# Patient Record
Sex: FEMALE | Race: WHITE | Employment: OTHER | URBAN - METROPOLITAN AREA
[De-identification: names, ages, dates, MRNs, and addresses within clinical notes are randomized per-mention and may not be internally consistent; named-entity substitution may affect disease eponyms.]

---

## 2017-12-05 ENCOUNTER — HOSPITAL ENCOUNTER (OUTPATIENT)
Dept: ENDOSCOPY | Age: 77
Discharge: HOME | End: 2017-12-05
Attending: INTERNAL MEDICINE | Admitting: INTERNAL MEDICINE

## 2017-12-05 RX ORDER — SODIUM CHLORIDE 0.9 % (FLUSH) 0.9 %
10 SYRINGE (ML) INJECTION PRN
Status: DISCONTINUED | OUTPATIENT
Start: 2017-12-05 | End: 2017-12-06 | Stop reason: HOSPADM

## 2017-12-05 RX ORDER — SODIUM CHLORIDE 9 MG/ML
INJECTION, SOLUTION INTRAVENOUS CONTINUOUS
Status: DISCONTINUED | OUTPATIENT
Start: 2017-12-05 | End: 2017-12-06 | Stop reason: HOSPADM

## 2017-12-05 RX ORDER — SODIUM CHLORIDE 0.9 % (FLUSH) 0.9 %
10 SYRINGE (ML) INJECTION EVERY 12 HOURS SCHEDULED
Status: DISCONTINUED | OUTPATIENT
Start: 2017-12-05 | End: 2017-12-06 | Stop reason: HOSPADM

## 2017-12-05 NOTE — ANESTHESIA PRE-OP
Induction: intravenous. Anesthetic plan and risks discussed with patient. Plan discussed with CRNA.                   Emir Hayes MD   12/5/2017

## 2017-12-20 ENCOUNTER — OFFICE VISIT (OUTPATIENT)
Dept: ENT CLINIC | Age: 77
End: 2017-12-20

## 2017-12-20 VITALS
WEIGHT: 98.8 LBS | SYSTOLIC BLOOD PRESSURE: 105 MMHG | HEART RATE: 68 BPM | BODY MASS INDEX: 18.65 KG/M2 | DIASTOLIC BLOOD PRESSURE: 72 MMHG | HEIGHT: 61 IN

## 2017-12-20 DIAGNOSIS — J32.9 CHRONIC SINUSITIS, UNSPECIFIED LOCATION: Primary | ICD-10-CM

## 2017-12-20 DIAGNOSIS — R09.82 POST-NASAL DRIP: ICD-10-CM

## 2017-12-20 PROCEDURE — 99203 OFFICE O/P NEW LOW 30 MIN: CPT | Performed by: OTOLARYNGOLOGY

## 2017-12-20 RX ORDER — FENOFIBRATE 145 MG/1
134 TABLET, COATED ORAL DAILY
COMMUNITY

## 2017-12-20 NOTE — PROGRESS NOTES
polypectomy    KNEE SURGERY      ROTATOR CUFF REPAIR      SINUS SURGERY      UPPER GASTROINTESTINAL ENDOSCOPY  08/20/10         EXAMINATION:    VITALS SIGNS reviewed. Vitals:    12/20/17 1503   BP: 105/72   Site: Left Arm   Position: Sitting   Pulse: 68   Weight: 98 lb 12.8 oz (44.8 kg)   Height: 5' 1\" (1.549 m)     GENERAL. APPEARANCE:  Well developed, well nourished, no apparent distress, no apparent deformities. ABILITY TO COMMUNICATE/QUALITY OF VOICE:  Communicated without difficulty. Normal voice. INSPECTION, HEAD AND FACE:  Normal overall appearance, with no scars, lesions or masses. SINUSES:  The maxillary and frontal sinuses were nontender, bilaterally. SALIVARY GLANDS:  Parotid, submandibular and sublingual glands were normal.  FACIAL STRENGTH, MOTION:  Normal and equal for all five branches bilaterally. EYES:  Extraocular motion:  intact, normal primary gaze alignment. HEARING ASSESSMENT:  Finger rub:  Able to hear finger rub bilaterally. Tuning fork tests, 512 Hertz tuning fork:  Celestin midline. Rinne air > bone bilaterally. EARS, OTOSCOPY:  The TMs and EACs appeared to be normal bilaterally. EXTERNAL EAR/NOSE:  Normal pinnae and mastoids. Normal external nose. (+) NOSE:  The nasal septum was mildly deviated to the right. The inferior turbinates were normal.  The nasal mucosa and secretions were normal.  No pus or polyps were seen. LIPS, TEETH AND GUMS:  Normal.  OROPHARYNX/ORAL CAVITY:  Oral mucosa, hard and soft palates, tongue, and pharynx were normal.  NECK:  No masses or tenderness. No abnormal appearance, asymmetry or tracheal deviation. Laryngeal cartilages and hyoid bone were normal to palpation. THYROID:  No nodules, enlargement, tenderness or masses. LYMPH NODES, CERVICAL, FACIAL AND SUPRACLAVICULAR:  No lymphadenopathy. IMPRESSION / Lavern Loja / Say Cisneros:       Dakotah Smith was seen today for sinusitis.     Diagnoses and all orders for this visit:    Chronic sinusitis,

## 2018-01-24 ENCOUNTER — HOSPITAL ENCOUNTER (OUTPATIENT)
Dept: CT IMAGING | Age: 78
Discharge: HOME | End: 2018-01-24
Attending: OTOLARYNGOLOGY | Admitting: OTOLARYNGOLOGY

## 2018-01-24 DIAGNOSIS — J32.9 CHRONIC SINUSITIS, UNSPECIFIED LOCATION: ICD-10-CM

## 2018-01-24 DIAGNOSIS — J32.9 CHRONIC SINUSITIS: ICD-10-CM

## 2018-01-24 DIAGNOSIS — R09.82 POST-NASAL DRIP: ICD-10-CM

## 2018-01-29 ENCOUNTER — OFFICE VISIT (OUTPATIENT)
Dept: ENT CLINIC | Age: 78
End: 2018-01-29

## 2018-01-29 VITALS
WEIGHT: 92 LBS | DIASTOLIC BLOOD PRESSURE: 57 MMHG | BODY MASS INDEX: 17.38 KG/M2 | SYSTOLIC BLOOD PRESSURE: 106 MMHG | HEART RATE: 85 BPM

## 2018-01-29 DIAGNOSIS — R09.82 POST-NASAL DRIP: ICD-10-CM

## 2018-01-29 DIAGNOSIS — J32.0 RIGHT MAXILLARY SINUSITIS, CHRONIC: Primary | ICD-10-CM

## 2018-01-29 PROCEDURE — 31231 NASAL ENDOSCOPY DX: CPT | Performed by: OTOLARYNGOLOGY

## 2018-01-29 NOTE — PROGRESS NOTES
PROCEDURE - Diagnostic Nasal Endoscopy, bilateral  [CPT 28651]    INFORMED CONSENT    Risks, benefits, and alternatives of diagnostic nasal endoscopy were explained to the patient. Specific mention was made of the risk of nosebleed, drainage, throat numbness with difficulty swallowing, and pain from the procedure. The patient gave oral consent to proceed. INDICATIONS/HISTORY  Chronic, recurrent sinusitis, with daily headache, and postnasal drainage. History of prior sinus surgery 10 years ago, with no improvement. twice in the past.. FINDINGS    The nasal septum was essentially midline. There were well-healed post surgical changes bilaterally. On the left, there was evidence of a sphenoid sinusotomy, which was patent and clear. The left middle meatus and nasal cavity was clear with no pus or polyps. The left uncinate process and natural ostium in the maxillary sinus appeared to be intact. Frontal recess area appeared to be clear. On the right side, there was a normal sphenoid sinus ostium, which was patent. The right middle meatus was clear and the antrostomy patent. The frontal recess was clear and the frontal sinus ostia was visible and there was good transillumination of the right frontal sinus. There was no pus or polyps, on the right side. The middle and inferior turbinates appeared to be normal bilaterally. The remainder of the right left nasal cavities appeared to be normal.      DESCRIPTION OF PROCEDURE    The nasal cavities were decongested and anesthetized with a mixture of equal parts of 0.05% oxymetazoline solution and 4% lidocaine solution by nasal sprayer. This was repeated after 5 minutes. After an appropriate elapse of time, the 4.4 mm 30 degree rigid nasal telescope was inserted into the left nasal cavity. Endoscopy of the inferior and middle meatus and nasal cavity to the posterior choana was performed with the above findings.   The procedure was repeated on the right side with the above findings. The patient tolerated the procedure well with no evidence of complication. Instructions were given to avoid eating or drinking for 1 hour. REVIEW OF IMAGES:       I independently reviewed the images of the CT scan of the sinuses, of 1/24/2018, showing minimal mucosal thickening in the floor of the right maxillary sinus. The remainder of the sinuses were all clear and normal.  There was evidence of prior endoscopic sinus surgery with bilateral middle meatal antrostomies and partial ethmoidectomy bilaterally. Narrative   EXAMINATION:   CT OF THE SINUS WITHOUT CONTRAST  1/24/2018 11:42 am       TECHNIQUE:   CT of the sinuses was performed without the administration of intravenous   contrast. Multiplanar reformatted images are provided for review. Dose   modulation, iterative reconstruction, and/or weight based adjustment of the   mA/kV was utilized to reduce the radiation dose to as low as reasonably   achievable.       COMPARISON:   CT 02/25/2011       HISTORY:   ORDERING PHYSICIAN PROVIDED HISTORY: Chronic sinusitis, unspecified location   TECHNOLOGIST PROVIDED HISTORY:   Technologist Provided Reason for Exam: Chronic sinusitis, unspecified   location-Post-nasal drip chronic post nasal drip and chronic sinusitis, h/o   sinus surgery 10 years ago   Acuity: Chronic   Type of Encounter: Ongoing       FINDINGS:   SINUSES:  The left maxillary, sphenoid and frontal sinuses are clear.    Minimal mucosal thickening in the ethmoid air cells and right maxillary   sinus.  Postsurgical changes of resection of the middle turbinates.       MASTOIDS:  The mastoid air cells are well aerated.       SOFT TISSUES:  Visualized soft tissues demonstrate no acute abnormality.  The   visualized portion of the intracranial contents demonstrate no gross acute   abnormality.           Impression   Postsurgical changes in the maxillary sinuses and nasal passage with only   minimal mucosal thickening in the right maxillary sinus and ethmoid air cells.             IMPRESSION / DIAGNOSES / Aries Duncan / PROCEDURES:       Laith Valdez was seen today for sinusitis. Diagnoses and all orders for this visit:    Right maxillary sinusitis, chronic    Post-nasal drip         RECOMMENDATIONS / PLAN:             1. I do not recommend revision functional endoscopic sinus surgery at this time. 2. Return for episodes of acute sinusitis, if possible. 3. Sinus rinse 1 to 2 times daily. See patient instructions. Return for any further Ear, Nose, Throat, or Sinus problems.

## 2019-06-06 ENCOUNTER — HOSPITAL ENCOUNTER (OUTPATIENT)
Dept: GENERAL RADIOLOGY | Age: 79
Discharge: HOME | End: 2019-06-06
Payer: MEDICARE

## 2019-06-06 ENCOUNTER — HOSPITAL ENCOUNTER (OUTPATIENT)
Age: 79
Discharge: HOME | End: 2019-06-06
Payer: MEDICARE

## 2019-06-06 DIAGNOSIS — Z78.9 DISCOMFORT: ICD-10-CM

## 2019-06-06 PROCEDURE — 74019 RADEX ABDOMEN 2 VIEWS: CPT

## 2021-04-30 PROCEDURE — 99283 EMERGENCY DEPT VISIT LOW MDM: CPT

## 2021-05-01 ENCOUNTER — HOSPITAL ENCOUNTER (EMERGENCY)
Age: 81
Discharge: HOME | End: 2021-05-01
Payer: MEDICARE

## 2021-05-01 VITALS
BODY MASS INDEX: 18.89 KG/M2 | TEMPERATURE: 97.6 F | RESPIRATION RATE: 18 BRPM | HEART RATE: 59 BPM | OXYGEN SATURATION: 98 % | WEIGHT: 100 LBS | SYSTOLIC BLOOD PRESSURE: 126 MMHG | DIASTOLIC BLOOD PRESSURE: 62 MMHG

## 2021-05-01 DIAGNOSIS — T50.901A ACCIDENTAL DRUG INGESTION, INITIAL ENCOUNTER: Primary | ICD-10-CM

## 2021-05-01 LAB
A/G RATIO: 1.8 (ref 1.1–2.2)
ALBUMIN SERPL-MCNC: 4.2 G/DL (ref 3.4–5)
ALP BLD-CCNC: 54 U/L (ref 40–129)
ALT SERPL-CCNC: 15 U/L (ref 10–40)
ANION GAP SERPL CALCULATED.3IONS-SCNC: 7 MMOL/L (ref 3–16)
AST SERPL-CCNC: 23 U/L (ref 15–37)
BASOPHILS ABSOLUTE: 0 K/UL (ref 0–0.2)
BASOPHILS RELATIVE PERCENT: 0.3 %
BILIRUB SERPL-MCNC: 0.3 MG/DL (ref 0–1)
BUN BLDV-MCNC: 23 MG/DL (ref 7–20)
CALCIUM SERPL-MCNC: 9.9 MG/DL (ref 8.3–10.6)
CHLORIDE BLD-SCNC: 106 MMOL/L (ref 99–110)
CO2: 26 MMOL/L (ref 21–32)
CREAT SERPL-MCNC: 1.1 MG/DL (ref 0.6–1.2)
EKG ATRIAL RATE: 59 BPM
EKG DIAGNOSIS: NORMAL
EKG P AXIS: 70 DEGREES
EKG P-R INTERVAL: 166 MS
EKG Q-T INTERVAL: 428 MS
EKG QRS DURATION: 74 MS
EKG QTC CALCULATION (BAZETT): 423 MS
EKG R AXIS: 20 DEGREES
EKG T AXIS: 50 DEGREES
EKG VENTRICULAR RATE: 59 BPM
EOSINOPHILS ABSOLUTE: 0.2 K/UL (ref 0–0.6)
EOSINOPHILS RELATIVE PERCENT: 3.1 %
GFR AFRICAN AMERICAN: 58
GFR NON-AFRICAN AMERICAN: 48
GLOBULIN: 2.4 G/DL
GLUCOSE BLD-MCNC: 112 MG/DL (ref 70–99)
HCT VFR BLD CALC: 38 % (ref 36–48)
HEMOGLOBIN: 12.2 G/DL (ref 12–16)
LYMPHOCYTES ABSOLUTE: 1.1 K/UL (ref 1–5.1)
LYMPHOCYTES RELATIVE PERCENT: 19.2 %
MCH RBC QN AUTO: 28.6 PG (ref 26–34)
MCHC RBC AUTO-ENTMCNC: 32.1 G/DL (ref 31–36)
MCV RBC AUTO: 89.2 FL (ref 80–100)
MONOCYTES ABSOLUTE: 0.4 K/UL (ref 0–1.3)
MONOCYTES RELATIVE PERCENT: 7.6 %
NEUTROPHILS ABSOLUTE: 4.1 K/UL (ref 1.7–7.7)
NEUTROPHILS RELATIVE PERCENT: 69.8 %
PDW BLD-RTO: 14 % (ref 12.4–15.4)
PLATELET # BLD: 243 K/UL (ref 135–450)
PMV BLD AUTO: 9 FL (ref 5–10.5)
POTASSIUM REFLEX MAGNESIUM: 4 MMOL/L (ref 3.5–5.1)
RBC # BLD: 4.26 M/UL (ref 4–5.2)
SODIUM BLD-SCNC: 139 MMOL/L (ref 136–145)
TOTAL PROTEIN: 6.6 G/DL (ref 6.4–8.2)
WBC # BLD: 5.9 K/UL (ref 4–11)

## 2021-05-01 PROCEDURE — 93005 ELECTROCARDIOGRAM TRACING: CPT | Performed by: PHYSICIAN ASSISTANT

## 2021-05-01 PROCEDURE — 36415 COLL VENOUS BLD VENIPUNCTURE: CPT

## 2021-05-01 PROCEDURE — 80053 COMPREHEN METABOLIC PANEL: CPT

## 2021-05-01 PROCEDURE — 93010 ELECTROCARDIOGRAM REPORT: CPT | Performed by: INTERNAL MEDICINE

## 2021-05-01 PROCEDURE — 85025 COMPLETE CBC W/AUTO DIFF WBC: CPT

## 2021-05-01 ASSESSMENT — ENCOUNTER SYMPTOMS
VOMITING: 0
SHORTNESS OF BREATH: 0
NAUSEA: 0
ABDOMINAL PAIN: 0

## 2021-05-01 NOTE — ED PROVIDER NOTES
This is an independent ELIAN patient encounter. I am available for consultation if needed. I did not perform a face-to-face evaluation of this patient and was not asked to see the patient. I was not made aware of any details of the patient's H&P or medical decision making but was asked to review and document this EKG. See my interpretation of the EKG below. I shared my findings and interpretation with the ELIAN for use in his/her independent management of this patient. See his/her note for details of the patient's history, physical, and all medical decision making.       The 12 lead EKG was interpreted by me as follows:  Rate: bradycardia with a rate of 59  Rhythm: sinus  Axis: normal  Intervals: normal FL, narrow QRS, normal QTc  ST segments: no ST elevations or depressions  T waves: no abnormal inversions  Non-specific T wave changes: not present  Prior EKG comparison: No prior is currently available for comparison          Kristi Bahena MD  05/01/21 0198

## 2022-11-10 ENCOUNTER — OFFICE VISIT (OUTPATIENT)
Dept: ORTHOPEDIC SURGERY | Age: 82
End: 2022-11-10
Payer: MEDICARE

## 2022-11-10 VITALS — HEIGHT: 61 IN | WEIGHT: 100 LBS | BODY MASS INDEX: 18.88 KG/M2

## 2022-11-10 DIAGNOSIS — M17.12 PRIMARY OSTEOARTHRITIS OF LEFT KNEE: Primary | ICD-10-CM

## 2022-11-10 DIAGNOSIS — M25.562 LEFT KNEE PAIN, UNSPECIFIED CHRONICITY: ICD-10-CM

## 2022-11-10 PROCEDURE — 99203 OFFICE O/P NEW LOW 30 MIN: CPT | Performed by: ORTHOPAEDIC SURGERY

## 2022-11-10 PROCEDURE — 1090F PRES/ABSN URINE INCON ASSESS: CPT | Performed by: ORTHOPAEDIC SURGERY

## 2022-11-10 PROCEDURE — G8484 FLU IMMUNIZE NO ADMIN: HCPCS | Performed by: ORTHOPAEDIC SURGERY

## 2022-11-10 PROCEDURE — G8427 DOCREV CUR MEDS BY ELIG CLIN: HCPCS | Performed by: ORTHOPAEDIC SURGERY

## 2022-11-10 PROCEDURE — 20610 DRAIN/INJ JOINT/BURSA W/O US: CPT | Performed by: ORTHOPAEDIC SURGERY

## 2022-11-10 PROCEDURE — G8420 CALC BMI NORM PARAMETERS: HCPCS | Performed by: ORTHOPAEDIC SURGERY

## 2022-11-10 RX ORDER — TRIAMCINOLONE ACETONIDE 40 MG/ML
40 INJECTION, SUSPENSION INTRA-ARTICULAR; INTRAMUSCULAR ONCE
Status: COMPLETED | OUTPATIENT
Start: 2022-11-10 | End: 2022-11-10

## 2022-11-10 RX ORDER — LIDOCAINE HYDROCHLORIDE 10 MG/ML
40 INJECTION, SOLUTION INFILTRATION; PERINEURAL ONCE
Status: COMPLETED | OUTPATIENT
Start: 2022-11-10 | End: 2022-11-10

## 2022-11-10 RX ADMIN — TRIAMCINOLONE ACETONIDE 40 MG: 40 INJECTION, SUSPENSION INTRA-ARTICULAR; INTRAMUSCULAR at 15:35

## 2022-11-10 RX ADMIN — LIDOCAINE HYDROCHLORIDE 40 MG: 10 INJECTION, SOLUTION INFILTRATION; PERINEURAL at 15:34

## 2022-11-12 PROBLEM — M17.12 PRIMARY OSTEOARTHRITIS OF LEFT KNEE: Status: ACTIVE | Noted: 2022-11-12

## 2022-11-12 NOTE — PROGRESS NOTES
CHIEF COMPLAINT: Left knee pain/osteoarthritis. HISTORY:  Ms. Gina Palacio 79 y.o.  female presents today for consultation request from Harika Clarke MD for evaluation of left knee pain which started Oct 2022. She is complaining of sharp pain, 10/10. Pain is increase with standing and walking and decrease with rest. Pain is sharp early in the morning with first few steps, dull achy pain by the end of the day. Alleviating factors: rest. No radiation and no numbness and tingling sensation. No other complaint. No h/o trauma or gout.     Past Medical History:   Diagnosis Date    Clostridium difficile diarrhea 12/7/14    Diverticulitis     GERD (gastroesophageal reflux disease)     GERD (gastroesophageal reflux disease)     Hyperlipidemia     Migraine     Pancreatitis        Past Surgical History:   Procedure Laterality Date    APPENDECTOMY      CHOLECYSTECTOMY      COLON SURGERY  8/21/2010    subtotal    COLONOSCOPY  08/20/10    with polypectomy    KNEE SURGERY      ROTATOR CUFF REPAIR      SINUS SURGERY      UPPER GASTROINTESTINAL ENDOSCOPY  08/20/10       Social History     Socioeconomic History    Marital status:      Spouse name: Not on file    Number of children: Not on file    Years of education: Not on file    Highest education level: Not on file   Occupational History    Not on file   Tobacco Use    Smoking status: Never    Smokeless tobacco: Never   Substance and Sexual Activity    Alcohol use: No    Drug use: No    Sexual activity: Yes     Partners: Male   Other Topics Concern    Not on file   Social History Narrative    Not on file     Social Determinants of Health     Financial Resource Strain: Not on file   Food Insecurity: Not on file   Transportation Needs: Not on file   Physical Activity: Not on file   Stress: Not on file   Social Connections: Not on file   Intimate Partner Violence: Not on file   Housing Stability: Not on file       Family History   Problem Relation Age of Onset    Heart Disease Mother     Diabetes Brother        Current Outpatient Medications on File Prior to Visit   Medication Sig Dispense Refill    fenofibrate (TRICOR) 145 MG tablet Take 134 mg by mouth daily      Fexofenadine HCl (MUCINEX ALLERGY PO) Take by mouth daily      topiramate (TOPAMAX SPRINKLE) 25 MG capsule Take 25 mg by mouth daily      Pseudoephedrine-Guaifenesin (MUCINEX D PO) Take 1 tablet by mouth 2 times daily. aspirin 81 MG chewable tablet Take 81 mg by mouth daily. SUMAtriptan (IMITREX) 25 MG tablet Take 25 mg by mouth once as needed. dicyclomine (BENTYL) 20 MG tablet Take 40 mg by mouth three times daily. diphenoxylate-atropine (LOMOTIL) 2.5-0.025 MG per tablet Take 1 tablet by mouth 4 times daily as needed. SUMAtriptan (IMITREX) 6 MG/0.5ML SOLN injection Inject 6 mg into the skin once as needed. butalbital-acetaminophen-caffeine (FIORICET, ESGIC) per tablet Take 1 tablet by mouth every 4 hours as needed. calcium carbonate (OSCAL) 500 MG TABS tablet Take 500 mg by mouth 2 times daily. atorvastatin (LIPITOR) 20 MG tablet Take 20 mg by mouth daily. esomeprazole (NEXIUM) 40 MG capsule Take 40 mg by mouth every morning (before breakfast). No current facility-administered medications on file prior to visit. Pertinent items are noted in HPI  Review of systems reviewed from Patient History Form and available in the patient's chart under the Media tab. PHYSICAL EXAMINATION:  Ms. Dionisio Luis is a very pleasant 79 y.o.  female who presents today in no acute distress, awake, alert, and oriented. She is well dressed, nourished and  groomed. Patient with normal affect. Height is  5' 1\" (1.549 m), weight is 100 lb (45.4 kg), Body mass index is 18.89 kg/m². Resting respiratory rate is 16. Examination of the gait, showed that the patient walks heel-toe with a non-antalgic gait and no limp.   Examination of both knees showing full ROM, left mild crepitus, tenderness on medial joint line, stable to varus and valgus stress. She has intact sensation and good pedal pulses. She has good strength in 2 planes, and has mild tenderness on deep palpation over the medial joint line. Knee reflex 1+ bilaterally. IMAGING:  Xray 3 views of the left knee was obtained today in the office and reviewed. These demonstrate moderate degenerative changes with narrowing of the joint space in the medial joint space compartment, subchondral sclerosis, and marginal osteophytosis. IMPRESSION: Left knee DJD. PLAN: I discussed with the patient the treatment options including both surgical and non-surgical treatment. We recommended Quad exercises and stretching of the calf and hamstrings which was taught to the patient today. She will take NSAIDS. I believe she will benefit from cortisone injection left knee, and she agreed to have. PROCEDURE:    With the patient's permission, and under sterile condition, the left knee was prepared and draped with alcohol and injected with a mixture of 1 mL Kenalog 40mg and 4 mL of 1% lidocaine through the medial parapatellar port area. The patient tolerated the procedure well. A band-aid was applied and the patient was advised to ice the knee for 15-20 minutes to relieve any injection site related pain. She reported a good improvement immediatly after the injection. F/u in 3-4 months, PT if needed. She understands that this may need surgery if the pain did not to resolve. Thank you very much for the kind consultation and allowing me to participate in this patient's care. I will continue to keep you apprised of her progress.         Emilia Hernández MD

## 2023-03-27 ENCOUNTER — HOSPITAL ENCOUNTER (OUTPATIENT)
Dept: GENERAL RADIOLOGY | Age: 83
Discharge: HOME | End: 2023-03-27
Payer: MEDICARE

## 2023-03-27 ENCOUNTER — HOSPITAL ENCOUNTER (OUTPATIENT)
Age: 83
Discharge: HOME | End: 2023-03-27
Payer: MEDICARE

## 2023-03-27 DIAGNOSIS — R05.9 COMPLAINING OF COUGH: ICD-10-CM

## 2023-03-27 PROCEDURE — 71046 X-RAY EXAM CHEST 2 VIEWS: CPT

## 2023-04-19 ENCOUNTER — HOSPITAL ENCOUNTER (OUTPATIENT)
Dept: CT IMAGING | Age: 83
Discharge: HOME | End: 2023-04-19
Payer: MEDICARE

## 2023-04-19 DIAGNOSIS — D38.1 NEOPLASM OF UNCERTAIN BEHAVIOR OF TRACHEA, BRONCHUS, AND LUNG: ICD-10-CM

## 2023-04-19 PROCEDURE — 71260 CT THORAX DX C+: CPT

## 2023-04-19 PROCEDURE — 6360000004 HC RX CONTRAST MEDICATION: Performed by: OTOLARYNGOLOGY

## 2023-04-19 RX ADMIN — IOPAMIDOL 75 ML: 755 INJECTION, SOLUTION INTRAVENOUS at 13:13

## 2023-04-26 ENCOUNTER — TELEPHONE (OUTPATIENT)
Dept: PULMONOLOGY | Age: 83
End: 2023-04-26

## 2023-04-26 NOTE — TELEPHONE ENCOUNTER
HAO on  for pt to call office to schedule a NP appt with Dr. Angella Perales.   Appt held 04/27/23 at 1:45pm

## 2023-04-27 ENCOUNTER — OFFICE VISIT (OUTPATIENT)
Dept: PULMONOLOGY | Age: 83
End: 2023-04-27
Payer: MEDICARE

## 2023-04-27 VITALS
OXYGEN SATURATION: 98 % | WEIGHT: 95 LBS | SYSTOLIC BLOOD PRESSURE: 131 MMHG | HEART RATE: 73 BPM | DIASTOLIC BLOOD PRESSURE: 70 MMHG | BODY MASS INDEX: 17.95 KG/M2

## 2023-04-27 DIAGNOSIS — R91.8 MULTIPLE LUNG NODULES ON CT: Primary | ICD-10-CM

## 2023-04-27 PROCEDURE — G8427 DOCREV CUR MEDS BY ELIG CLIN: HCPCS | Performed by: INTERNAL MEDICINE

## 2023-04-27 PROCEDURE — 1090F PRES/ABSN URINE INCON ASSESS: CPT | Performed by: INTERNAL MEDICINE

## 2023-04-27 PROCEDURE — G8419 CALC BMI OUT NRM PARAM NOF/U: HCPCS | Performed by: INTERNAL MEDICINE

## 2023-04-27 PROCEDURE — 99204 OFFICE O/P NEW MOD 45 MIN: CPT | Performed by: INTERNAL MEDICINE

## 2023-04-27 ASSESSMENT — ENCOUNTER SYMPTOMS
SINUS PRESSURE: 0
CHOKING: 0
BLOOD IN STOOL: 0
WHEEZING: 0
DIARRHEA: 0
VOICE CHANGE: 0
RHINORRHEA: 0
SHORTNESS OF BREATH: 0
ABDOMINAL PAIN: 0
CONSTIPATION: 0
APNEA: 0
CHEST TIGHTNESS: 0
COUGH: 1
ABDOMINAL DISTENTION: 0
STRIDOR: 0
BACK PAIN: 0
ANAL BLEEDING: 0
SORE THROAT: 0

## 2023-04-27 NOTE — PROGRESS NOTES
Yunior Galaviz    YOB: 1940     Date of Service:  4/27/2023     Chief Complaint   Patient presents with    Abnormal CT     Referred by Dr Flo Moise         HPI referred for consultation by Dr. Sabine Astorga MD for evaluation of abnormal CT chest.  Patient has been accompanied by her son to our office today. Patient states that she has had a chronic cough-persistent for approximately a month, but improved with Z-Sree which was prescribed by PCP. She initially was coughing up brown-colored phlegm. She also reports on and off hemoptysis, sometimes and copious quantities and sometimes associated with nosebleeds. Patient's  was diagnosed with severe COVID-19 infection requiring hospitalization, at around the same time of her illness. She states that she was never tested for COVID-19 herself. She underwent a CT chest performed on 4/19, which showed multiple lung nodules and hence pulmonary consultation has been requested by PCP. History of significant sinus related issues, surgery x2 in the remote past.  Continues to have recurrent postnasal drip and occasional epistaxis. Patient also reports recurrent \"bronchitis episodes\" in the past.    Patient denies any shortness of breath, chest pain or fevers. She has been cachectic-but weight has been steady for over 10 years. Patient has history of subtotal colectomy related to diverticular bleed in 2010. Non-smoker. 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     Allergies   Allergen Reactions    Sulfa Antibiotics      No outpatient medications have been marked as taking for the 4/27/23 encounter (Office Visit) with Luis Alfredo Carballo MD.       Immunization History   Administered Date(s) Administered    COVID-19, PFIZER PURPLE top, DILUTE for use, (age 15 y+), 30mcg/0.3mL 04/12/2021, 05/03/2021    Influenza Virus Vaccine 10/06/2014    Pneumococcal Conjugate 7-valent (Rozella Rough) 10/20/2009       Past Medical History:

## 2023-05-22 ENCOUNTER — HOSPITAL ENCOUNTER (OUTPATIENT)
Age: 83
Discharge: HOME | End: 2023-05-22
Payer: MEDICARE

## 2023-05-22 ENCOUNTER — HOSPITAL ENCOUNTER (OUTPATIENT)
Dept: PET IMAGING | Age: 83
Discharge: HOME | End: 2023-05-22
Payer: MEDICARE

## 2023-05-22 DIAGNOSIS — R91.8 MULTIPLE LUNG NODULES ON CT: ICD-10-CM

## 2023-05-22 PROCEDURE — A9552 F18 FDG: HCPCS | Performed by: INTERNAL MEDICINE

## 2023-05-22 PROCEDURE — 3430000000 HC RX DIAGNOSTIC RADIOPHARMACEUTICAL: Performed by: INTERNAL MEDICINE

## 2023-05-22 PROCEDURE — 78815 PET IMAGE W/CT SKULL-THIGH: CPT

## 2023-05-22 RX ORDER — FLUDEOXYGLUCOSE F 18 200 MCI/ML
15.61 INJECTION, SOLUTION INTRAVENOUS
Status: COMPLETED | OUTPATIENT
Start: 2023-05-22 | End: 2023-05-22

## 2023-05-22 RX ADMIN — FLUDEOXYGLUCOSE F 18 15.61 MILLICURIE: 200 INJECTION, SOLUTION INTRAVENOUS at 10:44

## 2023-05-25 ENCOUNTER — OFFICE VISIT (OUTPATIENT)
Dept: PULMONOLOGY | Age: 83
End: 2023-05-25
Payer: MEDICARE

## 2023-05-25 VITALS
SYSTOLIC BLOOD PRESSURE: 126 MMHG | DIASTOLIC BLOOD PRESSURE: 68 MMHG | OXYGEN SATURATION: 96 % | WEIGHT: 96 LBS | BODY MASS INDEX: 18.12 KG/M2 | HEIGHT: 61 IN | HEART RATE: 74 BPM

## 2023-05-25 DIAGNOSIS — J47.9 BRONCHIECTASIS WITHOUT COMPLICATION (HCC): ICD-10-CM

## 2023-05-25 DIAGNOSIS — R91.8 MULTIPLE LUNG NODULES ON CT: Primary | ICD-10-CM

## 2023-05-25 PROCEDURE — G8419 CALC BMI OUT NRM PARAM NOF/U: HCPCS | Performed by: INTERNAL MEDICINE

## 2023-05-25 PROCEDURE — G8427 DOCREV CUR MEDS BY ELIG CLIN: HCPCS | Performed by: INTERNAL MEDICINE

## 2023-05-25 PROCEDURE — G8400 PT W/DXA NO RESULTS DOC: HCPCS | Performed by: INTERNAL MEDICINE

## 2023-05-25 PROCEDURE — 1090F PRES/ABSN URINE INCON ASSESS: CPT | Performed by: INTERNAL MEDICINE

## 2023-05-25 PROCEDURE — 1036F TOBACCO NON-USER: CPT | Performed by: INTERNAL MEDICINE

## 2023-05-25 PROCEDURE — 1123F ACP DISCUSS/DSCN MKR DOCD: CPT | Performed by: INTERNAL MEDICINE

## 2023-05-25 PROCEDURE — 99214 OFFICE O/P EST MOD 30 MIN: CPT | Performed by: INTERNAL MEDICINE

## 2023-05-25 ASSESSMENT — ENCOUNTER SYMPTOMS
SINUS PRESSURE: 0
CONSTIPATION: 0
COUGH: 0
ANAL BLEEDING: 0
SORE THROAT: 0
DIARRHEA: 0
CHOKING: 0
RHINORRHEA: 0
VOICE CHANGE: 0
CHEST TIGHTNESS: 0
APNEA: 0
BACK PAIN: 0
WHEEZING: 0
ABDOMINAL DISTENTION: 0
ABDOMINAL PAIN: 0
SHORTNESS OF BREATH: 0
BLOOD IN STOOL: 0
STRIDOR: 0

## 2023-05-25 NOTE — PROGRESS NOTES
05/01/21 126/62         Physical Exam  Constitutional:       General: She is not in acute distress. Appearance: She is well-developed. She is not ill-appearing or diaphoretic. HENT:      Mouth/Throat:      Pharynx: No oropharyngeal exudate. Cardiovascular:      Rate and Rhythm: Normal rate and regular rhythm. Heart sounds: Normal heart sounds. No murmur heard. No friction rub. Pulmonary:      Effort: No respiratory distress. Breath sounds: Rales present. No wheezing or rhonchi. Chest:      Chest wall: No tenderness. Abdominal:      General: There is no distension. Palpations: There is no mass. Tenderness: There is no abdominal tenderness. There is no guarding or rebound. Musculoskeletal:         General: No swelling or tenderness. Skin:     Coloration: Skin is not pale. Findings: No erythema or rash. Neurological:      Mental Status: She is alert and oriented to person, place, and time. Cranial Nerves: No cranial nerve deficit. Motor: No abnormal muscle tone. Coordination: Coordination normal.      Deep Tendon Reflexes: Reflexes normal.           Health Maintenance   Topic Date Due    Depression Screen  Never done    DTaP/Tdap/Td vaccine (1 - Tdap) Never done    Pneumococcal 65+ years Vaccine (1 - PCV) 08/10/2004    Annual Wellness Visit (AWV)  Never done    COVID-19 Vaccine (3 - Booster for Pfizer series) 06/28/2021    Lipids  06/11/2022    Flu vaccine (Season Ended) 08/01/2023    DEXA (modify frequency per FRAX score)  Completed    Shingles vaccine  Completed    Hepatitis A vaccine  Aged Out    Hib vaccine  Aged Out    Meningococcal (ACWY) vaccine  Aged Out          Assessment/Plan:     Diagnosis Orders   1. Multiple lung nodules on CT  CT CHEST WO CONTRAST      2. Chronic bronchiectasis, not in exacerbation    I personally reviewed PET CT scan performed on 5/22-persistent bilateral nodular opacities noted, not PET avid.   Some nodules have

## 2023-08-15 ENCOUNTER — HOSPITAL ENCOUNTER (OUTPATIENT)
Dept: CT IMAGING | Age: 83
Discharge: HOME | End: 2023-08-15
Attending: INTERNAL MEDICINE
Payer: MEDICARE

## 2023-08-15 DIAGNOSIS — R91.8 MULTIPLE LUNG NODULES ON CT: ICD-10-CM

## 2023-08-15 PROCEDURE — 71250 CT THORAX DX C-: CPT

## 2023-08-21 ENCOUNTER — OFFICE VISIT (OUTPATIENT)
Dept: PULMONOLOGY | Age: 83
End: 2023-08-21
Payer: MEDICARE

## 2023-08-21 VITALS
WEIGHT: 97 LBS | HEIGHT: 61 IN | SYSTOLIC BLOOD PRESSURE: 122 MMHG | BODY MASS INDEX: 18.31 KG/M2 | OXYGEN SATURATION: 96 % | DIASTOLIC BLOOD PRESSURE: 68 MMHG | HEART RATE: 83 BPM

## 2023-08-21 DIAGNOSIS — R91.8 MULTIPLE LUNG NODULES ON CT: Primary | ICD-10-CM

## 2023-08-21 DIAGNOSIS — J47.9 BRONCHIECTASIS WITHOUT COMPLICATION (HCC): ICD-10-CM

## 2023-08-21 PROCEDURE — G8400 PT W/DXA NO RESULTS DOC: HCPCS | Performed by: INTERNAL MEDICINE

## 2023-08-21 PROCEDURE — 1090F PRES/ABSN URINE INCON ASSESS: CPT | Performed by: INTERNAL MEDICINE

## 2023-08-21 PROCEDURE — 99214 OFFICE O/P EST MOD 30 MIN: CPT | Performed by: INTERNAL MEDICINE

## 2023-08-21 PROCEDURE — 1123F ACP DISCUSS/DSCN MKR DOCD: CPT | Performed by: INTERNAL MEDICINE

## 2023-08-21 PROCEDURE — G8419 CALC BMI OUT NRM PARAM NOF/U: HCPCS | Performed by: INTERNAL MEDICINE

## 2023-08-21 PROCEDURE — G8427 DOCREV CUR MEDS BY ELIG CLIN: HCPCS | Performed by: INTERNAL MEDICINE

## 2023-08-21 PROCEDURE — 1036F TOBACCO NON-USER: CPT | Performed by: INTERNAL MEDICINE

## 2023-08-21 RX ORDER — ROPINIROLE 0.5 MG/1
0.5 TABLET, FILM COATED ORAL
COMMUNITY

## 2023-08-21 ASSESSMENT — ENCOUNTER SYMPTOMS
STRIDOR: 0
APNEA: 0
DIARRHEA: 0
SORE THROAT: 0
SHORTNESS OF BREATH: 0
ABDOMINAL DISTENTION: 0
CHOKING: 0
ANAL BLEEDING: 0
ABDOMINAL PAIN: 0
BLOOD IN STOOL: 0
WHEEZING: 0
COUGH: 1
VOICE CHANGE: 0
CONSTIPATION: 0
SINUS PRESSURE: 0
RHINORRHEA: 0
CHEST TIGHTNESS: 0

## 2023-08-21 NOTE — PROGRESS NOTES
Patient reached ___X_ yes  _____ no   VM instructions left ____ yes   phone number ________                                ____ no-office notified          Date __8/28/23_______  Time __0730_____  Arrival ___0600  hosp-endo___    Nothing to eat or drink after midnight-follow your doctors prep instructions-this may include taking a second dose of your prep after midnight  Responsible adult 25 or older to stay on site while you are here-drive you home-stay with you after  Follow any instructions your doctors office has given you  Bring a complete list of all your medications and supplements including name,dose,how often taken the day of your procedure  If you normally take the following medications in the morning please do so the AM of your procedure with a small sip of water       Heart,blood pressure,seizure,thyroid or breathing medications-use your inhalers-bring any rescue inhalers with you DOS       DO NOT take blood pressure medications ending in \"wyatt\" or \"pril\" the AM of procedure or evening prior  Dr Dany Coles patients are not to take any medications the AM of surgery  Take half or your normal dose of any long acting insulins the night before your procedure-do not take any diabetic medications the AM of procedure  Follow your doctors instructions regarding stopping or taking  any blood thinners-if you do not have instructions-call them-CHECK ASA 81MG  Any questions call your doctor  Other ________NONE______________________________________________________    [facility] POLICY(subject to change)             The current policy is 2 visitors per patient. There are no children allowed. Mask at discretion of facility. Visiting hours are 8a-8p. Overnight visitors will be at the discretion of the nurse. All policies are subject to change.

## 2023-08-21 NOTE — PROGRESS NOTES
suggestive of significant bronchiectatic changes with possible mucous plugging. Patient could have chronic infection such as MAC given her persistent cough. However, patient attributes her cough to postnasal drip. There is also a possibility for aspiration. Malignancy is quite unlikely given the type of nodules and presence of chronic bronchiectatic changes. Given findings on CT chest, we would perform bronchoscopy and bronchoalveolar lavage in order to rule out chronic infections. We would need to consider repeat CT imaging again in 3 to 6 months, which we will discuss during next visit. Return in about 1 month (around 9/21/2023).

## 2023-08-22 ENCOUNTER — TELEPHONE (OUTPATIENT)
Dept: PULMONOLOGY | Age: 83
End: 2023-08-22

## 2023-08-22 NOTE — TELEPHONE ENCOUNTER
Spoke with patient. Bronchoscopy scheduled 08/28/23 at 7:30am.  Patient to arrive at 6am.  Instructions given to patient at [facility] on 08/21/23.

## 2023-08-28 ENCOUNTER — APPOINTMENT (OUTPATIENT)
Dept: GENERAL RADIOLOGY | Age: 83
End: 2023-08-28
Payer: MEDICARE

## 2023-08-28 ENCOUNTER — ANESTHESIA (OUTPATIENT)
Dept: ENDOSCOPY | Age: 83
End: 2023-08-28
Payer: MEDICARE

## 2023-08-28 ENCOUNTER — APPOINTMENT (OUTPATIENT)
Dept: CT IMAGING | Age: 83
End: 2023-08-28
Payer: MEDICARE

## 2023-08-28 ENCOUNTER — HOSPITAL ENCOUNTER (OUTPATIENT)
Age: 83
Setting detail: OUTPATIENT SURGERY
Discharge: HOME | End: 2023-08-28
Attending: INTERNAL MEDICINE | Admitting: INTERNAL MEDICINE
Payer: MEDICARE

## 2023-08-28 ENCOUNTER — ANESTHESIA EVENT (OUTPATIENT)
Dept: ENDOSCOPY | Age: 83
End: 2023-08-28
Payer: MEDICARE

## 2023-08-28 ENCOUNTER — HOSPITAL ENCOUNTER (EMERGENCY)
Age: 83
Discharge: HOME | End: 2023-08-28
Attending: INTERNAL MEDICINE
Payer: MEDICARE

## 2023-08-28 ENCOUNTER — TELEPHONE (OUTPATIENT)
Dept: PULMONOLOGY | Age: 83
End: 2023-08-28

## 2023-08-28 VITALS
HEART RATE: 88 BPM | SYSTOLIC BLOOD PRESSURE: 132 MMHG | HEIGHT: 61 IN | RESPIRATION RATE: 19 BRPM | DIASTOLIC BLOOD PRESSURE: 66 MMHG | OXYGEN SATURATION: 91 % | TEMPERATURE: 98.1 F | BODY MASS INDEX: 18.31 KG/M2 | WEIGHT: 97 LBS

## 2023-08-28 VITALS
HEIGHT: 61 IN | TEMPERATURE: 98 F | WEIGHT: 97 LBS | BODY MASS INDEX: 18.31 KG/M2 | DIASTOLIC BLOOD PRESSURE: 69 MMHG | HEART RATE: 75 BPM | SYSTOLIC BLOOD PRESSURE: 114 MMHG | OXYGEN SATURATION: 94 % | RESPIRATION RATE: 23 BRPM

## 2023-08-28 DIAGNOSIS — R09.82 POST-NASAL DRIP: Primary | ICD-10-CM

## 2023-08-28 DIAGNOSIS — M54.6 ACUTE LEFT-SIDED THORACIC BACK PAIN: ICD-10-CM

## 2023-08-28 DIAGNOSIS — S39.012A STRAIN OF LUMBAR REGION, INITIAL ENCOUNTER: Primary | ICD-10-CM

## 2023-08-28 PROBLEM — R93.89 ABNORMAL CT OF THE CHEST: Status: ACTIVE | Noted: 2023-08-28

## 2023-08-28 LAB
ALBUMIN SERPL-MCNC: 4.4 G/DL (ref 3.4–5)
ALBUMIN/GLOB SERPL: 1.7 {RATIO} (ref 1.1–2.2)
ALP SERPL-CCNC: 77 U/L (ref 40–129)
ALT SERPL-CCNC: 19 U/L (ref 10–40)
ANION GAP SERPL CALCULATED.3IONS-SCNC: 8 MMOL/L (ref 3–16)
APPEARANCE BRONCH: ABNORMAL
AST SERPL-CCNC: 29 U/L (ref 15–37)
BASOPHILS # BLD: 0 K/UL (ref 0–0.2)
BASOPHILS NFR BLD: 0.5 %
BILIRUB SERPL-MCNC: <0.2 MG/DL (ref 0–1)
BILIRUB UR QL STRIP.AUTO: NEGATIVE
BUN SERPL-MCNC: 21 MG/DL (ref 7–20)
CALCIUM SERPL-MCNC: 9.2 MG/DL (ref 8.3–10.6)
CHLORIDE SERPL-SCNC: 112 MMOL/L (ref 99–110)
CILIATED BAL QL: 3 %
CLARITY UR: CLEAR
CLOT SPEC QL: ABNORMAL
CO2 SERPL-SCNC: 24 MMOL/L (ref 21–32)
COLOR BRONCH: ABNORMAL
COLOR UR: YELLOW
CREAT SERPL-MCNC: 0.9 MG/DL (ref 0.6–1.2)
DEPRECATED RDW RBC AUTO: 15.8 % (ref 12.4–15.4)
DEPRECATED RDW RBC AUTO: 16 % (ref 12.4–15.4)
EKG ATRIAL RATE: 86 BPM
EKG DIAGNOSIS: NORMAL
EKG P AXIS: 63 DEGREES
EKG P-R INTERVAL: 154 MS
EKG Q-T INTERVAL: 368 MS
EKG QRS DURATION: 74 MS
EKG QTC CALCULATION (BAZETT): 440 MS
EKG R AXIS: -24 DEGREES
EKG T AXIS: 56 DEGREES
EKG VENTRICULAR RATE: 86 BPM
EOSINOPHIL # BLD: 0.1 K/UL (ref 0–0.6)
EOSINOPHIL NFR BLD: 2.2 %
GFR SERPLBLD CREATININE-BSD FMLA CKD-EPI: >60 ML/MIN/{1.73_M2}
GLUCOSE SERPL-MCNC: 126 MG/DL (ref 70–99)
GLUCOSE UR STRIP.AUTO-MCNC: NEGATIVE MG/DL
HCT VFR BLD AUTO: 39.3 % (ref 36–48)
HCT VFR BLD AUTO: 42.6 % (ref 36–48)
HGB BLD-MCNC: 12.5 G/DL (ref 12–16)
HGB BLD-MCNC: 13.5 G/DL (ref 12–16)
HGB UR QL STRIP.AUTO: NEGATIVE
INR PPP: 0.94 (ref 0.84–1.16)
KETONES UR STRIP.AUTO-MCNC: NEGATIVE MG/DL
LEUKOCYTE ESTERASE UR QL STRIP.AUTO: NEGATIVE
LYMPHOCYTES # BLD: 1.3 K/UL (ref 1–5.1)
LYMPHOCYTES NFR BLD: 20.9 %
LYMPHOCYTES NFR BRONCH MANUAL: 1 % (ref 5–10)
MACROPHAGES NFR BRONCH MANUAL: 2 % (ref 90–95)
MCH RBC QN AUTO: 27.3 PG (ref 26–34)
MCH RBC QN AUTO: 27.6 PG (ref 26–34)
MCHC RBC AUTO-ENTMCNC: 31.8 G/DL (ref 31–36)
MCHC RBC AUTO-ENTMCNC: 31.9 G/DL (ref 31–36)
MCV RBC AUTO: 85.4 FL (ref 80–100)
MCV RBC AUTO: 86.9 FL (ref 80–100)
MONOCYTES # BLD: 0.3 K/UL (ref 0–1.3)
MONOCYTES NFR BLD: 4.2 %
MONONUC CELLS NFR FLD MANUAL: 1 %
NEUTROPHILS # BLD: 4.4 K/UL (ref 1.7–7.7)
NEUTROPHILS NFR BLD: 72.2 %
NEUTROPHILS NFR BRONCH MANUAL: 93 % (ref 5–10)
NITRITE UR QL STRIP.AUTO: NEGATIVE
NUC CELL # BRONCH MANUAL: 4139 /CUMM
PATH REV: YES
PH UR STRIP.AUTO: 7 [PH] (ref 5–8)
PLATELET # BLD AUTO: 242 K/UL (ref 135–450)
PLATELET # BLD AUTO: 272 K/UL (ref 135–450)
PMV BLD AUTO: 8.4 FL (ref 5–10.5)
PMV BLD AUTO: 8.8 FL (ref 5–10.5)
POTASSIUM SERPL-SCNC: 4.6 MMOL/L (ref 3.5–5.1)
PROT SERPL-MCNC: 7 G/DL (ref 6.4–8.2)
PROT UR STRIP.AUTO-MCNC: NEGATIVE MG/DL
PROTHROMBIN TIME: 12.6 SEC (ref 11.5–14.8)
RBC # BLD AUTO: 4.6 M/UL (ref 4–5.2)
RBC # BLD AUTO: 4.9 M/UL (ref 4–5.2)
RBC # FLD MANUAL: 9700 /CUMM
SODIUM SERPL-SCNC: 144 MMOL/L (ref 136–145)
SP GR UR STRIP.AUTO: 1.01 (ref 1–1.03)
TOTAL CELLS COUNTED BRONCH: 100
TROPONIN, HIGH SENSITIVITY: 11 NG/L (ref 0–14)
TROPONIN, HIGH SENSITIVITY: 7 NG/L (ref 0–14)
UA COMPLETE W REFLEX CULTURE PNL UR: NORMAL
UA DIPSTICK W REFLEX MICRO PNL UR: NORMAL
URN SPEC COLLECT METH UR: NORMAL
UROBILINOGEN UR STRIP-ACNC: 0.2 E.U./DL
WBC # BLD AUTO: 5.1 K/UL (ref 4–11)
WBC # BLD AUTO: 6.1 K/UL (ref 4–11)

## 2023-08-28 PROCEDURE — 2580000003 HC RX 258: Performed by: INTERNAL MEDICINE

## 2023-08-28 PROCEDURE — 87486 CHLMYD PNEUM DNA AMP PROBE: CPT

## 2023-08-28 PROCEDURE — 88305 TISSUE EXAM BY PATHOLOGIST: CPT

## 2023-08-28 PROCEDURE — 31624 DX BRONCHOSCOPE/LAVAGE: CPT | Performed by: INTERNAL MEDICINE

## 2023-08-28 PROCEDURE — 87449 NOS EACH ORGANISM AG IA: CPT

## 2023-08-28 PROCEDURE — 87184 SC STD DISK METHOD PER PLATE: CPT

## 2023-08-28 PROCEDURE — 84484 ASSAY OF TROPONIN QUANT: CPT

## 2023-08-28 PROCEDURE — 87015 SPECIMEN INFECT AGNT CONCNTJ: CPT

## 2023-08-28 PROCEDURE — 6360000002 HC RX W HCPCS: Performed by: REGISTERED NURSE

## 2023-08-28 PROCEDURE — 87496 CYTOMEG DNA AMP PROBE: CPT

## 2023-08-28 PROCEDURE — 87102 FUNGUS ISOLATION CULTURE: CPT

## 2023-08-28 PROCEDURE — 88312 SPECIAL STAINS GROUP 1: CPT

## 2023-08-28 PROCEDURE — 87070 CULTURE OTHR SPECIMN AEROBIC: CPT

## 2023-08-28 PROCEDURE — 88185 FLOWCYTOMETRY/TC ADD-ON: CPT

## 2023-08-28 PROCEDURE — 6360000002 HC RX W HCPCS: Performed by: PHYSICIAN ASSISTANT

## 2023-08-28 PROCEDURE — 96375 TX/PRO/DX INJ NEW DRUG ADDON: CPT

## 2023-08-28 PROCEDURE — 36415 COLL VENOUS BLD VENIPUNCTURE: CPT

## 2023-08-28 PROCEDURE — 88112 CYTOPATH CELL ENHANCE TECH: CPT

## 2023-08-28 PROCEDURE — 87281 PNEUMOCYSTIS CARINII AG IF: CPT

## 2023-08-28 PROCEDURE — 87116 MYCOBACTERIA CULTURE: CPT

## 2023-08-28 PROCEDURE — 89051 BODY FLUID CELL COUNT: CPT

## 2023-08-28 PROCEDURE — 87206 SMEAR FLUORESCENT/ACID STAI: CPT

## 2023-08-28 PROCEDURE — 2580000003 HC RX 258: Performed by: REGISTERED NURSE

## 2023-08-28 PROCEDURE — 3700000000 HC ANESTHESIA ATTENDED CARE: Performed by: INTERNAL MEDICINE

## 2023-08-28 PROCEDURE — 87077 CULTURE AEROBIC IDENTIFY: CPT

## 2023-08-28 PROCEDURE — 87305 ASPERGILLUS AG IA: CPT

## 2023-08-28 PROCEDURE — 87632 RESP VIRUS 6-11 TARGETS: CPT

## 2023-08-28 PROCEDURE — 80053 COMPREHEN METABOLIC PANEL: CPT

## 2023-08-28 PROCEDURE — 71045 X-RAY EXAM CHEST 1 VIEW: CPT

## 2023-08-28 PROCEDURE — 87798 DETECT AGENT NOS DNA AMP: CPT

## 2023-08-28 PROCEDURE — 6370000000 HC RX 637 (ALT 250 FOR IP): Performed by: INTERNAL MEDICINE

## 2023-08-28 PROCEDURE — 6360000004 HC RX CONTRAST MEDICATION: Performed by: PHYSICIAN ASSISTANT

## 2023-08-28 PROCEDURE — 93010 ELECTROCARDIOGRAM REPORT: CPT | Performed by: INTERNAL MEDICINE

## 2023-08-28 PROCEDURE — 74177 CT ABD & PELVIS W/CONTRAST: CPT

## 2023-08-28 PROCEDURE — 96374 THER/PROPH/DIAG INJ IV PUSH: CPT

## 2023-08-28 PROCEDURE — 7100000011 HC PHASE II RECOVERY - ADDTL 15 MIN: Performed by: INTERNAL MEDICINE

## 2023-08-28 PROCEDURE — 7100000010 HC PHASE II RECOVERY - FIRST 15 MIN: Performed by: INTERNAL MEDICINE

## 2023-08-28 PROCEDURE — 99285 EMERGENCY DEPT VISIT HI MDM: CPT

## 2023-08-28 PROCEDURE — 81003 URINALYSIS AUTO W/O SCOPE: CPT

## 2023-08-28 PROCEDURE — 87581 M.PNEUMON DNA AMP PROBE: CPT

## 2023-08-28 PROCEDURE — 3609010800 HC BRONCHOSCOPY ALVEOLAR LAVAGE: Performed by: INTERNAL MEDICINE

## 2023-08-28 PROCEDURE — 85025 COMPLETE CBC W/AUTO DIFF WBC: CPT

## 2023-08-28 PROCEDURE — 88184 FLOWCYTOMETRY/ TC 1 MARKER: CPT

## 2023-08-28 PROCEDURE — 87205 SMEAR GRAM STAIN: CPT

## 2023-08-28 PROCEDURE — 85027 COMPLETE CBC AUTOMATED: CPT

## 2023-08-28 PROCEDURE — 7100000000 HC PACU RECOVERY - FIRST 15 MIN: Performed by: INTERNAL MEDICINE

## 2023-08-28 PROCEDURE — 87186 SC STD MICRODIL/AGAR DIL: CPT

## 2023-08-28 PROCEDURE — 87541 LEGION PNEUMO DNA AMP PROB: CPT

## 2023-08-28 PROCEDURE — 93005 ELECTROCARDIOGRAM TRACING: CPT | Performed by: PHYSICIAN ASSISTANT

## 2023-08-28 PROCEDURE — 7100000001 HC PACU RECOVERY - ADDTL 15 MIN: Performed by: INTERNAL MEDICINE

## 2023-08-28 PROCEDURE — 2709999900 HC NON-CHARGEABLE SUPPLY: Performed by: INTERNAL MEDICINE

## 2023-08-28 PROCEDURE — 2500000003 HC RX 250 WO HCPCS: Performed by: REGISTERED NURSE

## 2023-08-28 PROCEDURE — 71260 CT THORAX DX C+: CPT

## 2023-08-28 PROCEDURE — 85610 PROTHROMBIN TIME: CPT

## 2023-08-28 PROCEDURE — 3700000001 HC ADD 15 MINUTES (ANESTHESIA): Performed by: INTERNAL MEDICINE

## 2023-08-28 RX ORDER — LORAZEPAM 2 MG/ML
1 INJECTION INTRAMUSCULAR ONCE
Status: COMPLETED | OUTPATIENT
Start: 2023-08-28 | End: 2023-08-28

## 2023-08-28 RX ORDER — AMOXICILLIN AND CLAVULANATE POTASSIUM 875; 125 MG/1; MG/1
1 TABLET, FILM COATED ORAL 2 TIMES DAILY
COMMUNITY

## 2023-08-28 RX ORDER — MORPHINE SULFATE 4 MG/ML
4 INJECTION, SOLUTION INTRAMUSCULAR; INTRAVENOUS
Status: COMPLETED | OUTPATIENT
Start: 2023-08-28 | End: 2023-08-28

## 2023-08-28 RX ORDER — HYDROMORPHONE HYDROCHLORIDE 2 MG/ML
0.25 INJECTION, SOLUTION INTRAMUSCULAR; INTRAVENOUS; SUBCUTANEOUS EVERY 5 MIN PRN
Status: DISCONTINUED | OUTPATIENT
Start: 2023-08-28 | End: 2023-08-28 | Stop reason: HOSPADM

## 2023-08-28 RX ORDER — LABETALOL HYDROCHLORIDE 5 MG/ML
5 INJECTION, SOLUTION INTRAVENOUS
Status: DISCONTINUED | OUTPATIENT
Start: 2023-08-28 | End: 2023-08-28 | Stop reason: HOSPADM

## 2023-08-28 RX ORDER — METHOCARBAMOL 750 MG/1
750 TABLET, FILM COATED ORAL 3 TIMES DAILY PRN
Qty: 20 TABLET | Refills: 0 | Status: SHIPPED | OUTPATIENT
Start: 2023-08-28

## 2023-08-28 RX ORDER — LIDOCAINE HYDROCHLORIDE 40 MG/ML
SOLUTION TOPICAL PRN
Status: DISCONTINUED | OUTPATIENT
Start: 2023-08-28 | End: 2023-08-28 | Stop reason: ALTCHOICE

## 2023-08-28 RX ORDER — SODIUM CHLORIDE 9 MG/ML
INJECTION, SOLUTION INTRAVENOUS CONTINUOUS PRN
Status: DISCONTINUED | OUTPATIENT
Start: 2023-08-28 | End: 2023-08-28 | Stop reason: SDUPTHER

## 2023-08-28 RX ORDER — SODIUM CHLORIDE 9 MG/ML
INJECTION, SOLUTION INTRAVENOUS CONTINUOUS
Status: DISCONTINUED | OUTPATIENT
Start: 2023-08-28 | End: 2023-08-28 | Stop reason: HOSPADM

## 2023-08-28 RX ORDER — LIDOCAINE HYDROCHLORIDE 20 MG/ML
INJECTION, SOLUTION EPIDURAL; INFILTRATION; INTRACAUDAL; PERINEURAL PRN
Status: DISCONTINUED | OUTPATIENT
Start: 2023-08-28 | End: 2023-08-28 | Stop reason: SDUPTHER

## 2023-08-28 RX ORDER — HYDROMORPHONE HYDROCHLORIDE 2 MG/ML
0.5 INJECTION, SOLUTION INTRAMUSCULAR; INTRAVENOUS; SUBCUTANEOUS EVERY 5 MIN PRN
Status: DISCONTINUED | OUTPATIENT
Start: 2023-08-28 | End: 2023-08-28 | Stop reason: HOSPADM

## 2023-08-28 RX ORDER — SODIUM CHLORIDE 9 MG/ML
INJECTION, SOLUTION INTRAVENOUS PRN
Status: DISCONTINUED | OUTPATIENT
Start: 2023-08-28 | End: 2023-08-28 | Stop reason: HOSPADM

## 2023-08-28 RX ORDER — ONDANSETRON 2 MG/ML
4 INJECTION INTRAMUSCULAR; INTRAVENOUS
Status: DISCONTINUED | OUTPATIENT
Start: 2023-08-28 | End: 2023-08-28 | Stop reason: HOSPADM

## 2023-08-28 RX ORDER — SODIUM CHLORIDE 0.9 % (FLUSH) 0.9 %
5-40 SYRINGE (ML) INJECTION EVERY 12 HOURS SCHEDULED
Status: DISCONTINUED | OUTPATIENT
Start: 2023-08-28 | End: 2023-08-28 | Stop reason: HOSPADM

## 2023-08-28 RX ORDER — ONDANSETRON 2 MG/ML
4 INJECTION INTRAMUSCULAR; INTRAVENOUS ONCE
Status: COMPLETED | OUTPATIENT
Start: 2023-08-28 | End: 2023-08-28

## 2023-08-28 RX ORDER — PROPOFOL 10 MG/ML
INJECTION, EMULSION INTRAVENOUS CONTINUOUS PRN
Status: DISCONTINUED | OUTPATIENT
Start: 2023-08-28 | End: 2023-08-28 | Stop reason: SDUPTHER

## 2023-08-28 RX ORDER — PROPOFOL 10 MG/ML
INJECTION, EMULSION INTRAVENOUS PRN
Status: DISCONTINUED | OUTPATIENT
Start: 2023-08-28 | End: 2023-08-28 | Stop reason: SDUPTHER

## 2023-08-28 RX ORDER — LEVOTHYROXINE SODIUM 0.05 MG/1
50 TABLET ORAL DAILY
COMMUNITY

## 2023-08-28 RX ORDER — SODIUM CHLORIDE 0.9 % (FLUSH) 0.9 %
5-40 SYRINGE (ML) INJECTION PRN
Status: DISCONTINUED | OUTPATIENT
Start: 2023-08-28 | End: 2023-08-28 | Stop reason: HOSPADM

## 2023-08-28 RX ADMIN — SODIUM CHLORIDE: 9 INJECTION, SOLUTION INTRAVENOUS at 07:00

## 2023-08-28 RX ADMIN — SODIUM CHLORIDE: 9 INJECTION, SOLUTION INTRAVENOUS at 07:30

## 2023-08-28 RX ADMIN — IOPAMIDOL 75 ML: 755 INJECTION, SOLUTION INTRAVENOUS at 11:37

## 2023-08-28 RX ADMIN — LIDOCAINE HYDROCHLORIDE 40 MG: 20 INJECTION, SOLUTION EPIDURAL; INFILTRATION; INTRACAUDAL; PERINEURAL at 07:36

## 2023-08-28 RX ADMIN — PROPOFOL 20 MG: 10 INJECTION, EMULSION INTRAVENOUS at 07:37

## 2023-08-28 RX ADMIN — PROPOFOL 20 MG: 10 INJECTION, EMULSION INTRAVENOUS at 07:36

## 2023-08-28 RX ADMIN — LORAZEPAM 1 MG: 2 INJECTION, SOLUTION INTRAMUSCULAR; INTRAVENOUS at 12:45

## 2023-08-28 RX ADMIN — PROPOFOL 10 MG: 10 INJECTION, EMULSION INTRAVENOUS at 07:41

## 2023-08-28 RX ADMIN — ONDANSETRON 4 MG: 2 INJECTION INTRAMUSCULAR; INTRAVENOUS at 10:58

## 2023-08-28 RX ADMIN — PROPOFOL 10 MG: 10 INJECTION, EMULSION INTRAVENOUS at 07:47

## 2023-08-28 RX ADMIN — PROPOFOL 10 MG: 10 INJECTION, EMULSION INTRAVENOUS at 07:43

## 2023-08-28 RX ADMIN — MORPHINE SULFATE 4 MG: 4 INJECTION, SOLUTION INTRAMUSCULAR; INTRAVENOUS at 10:58

## 2023-08-28 RX ADMIN — PROPOFOL 100 MCG/KG/MIN: 10 INJECTION, EMULSION INTRAVENOUS at 07:36

## 2023-08-28 ASSESSMENT — PAIN DESCRIPTION - DESCRIPTORS: DESCRIPTORS: ACHING

## 2023-08-28 ASSESSMENT — PAIN SCALES - GENERAL
PAINLEVEL_OUTOF10: 0
PAINLEVEL_OUTOF10: 10
PAINLEVEL_OUTOF10: 0
PAINLEVEL_OUTOF10: 9
PAINLEVEL_OUTOF10: 10

## 2023-08-28 ASSESSMENT — LIFESTYLE VARIABLES
HOW MANY STANDARD DRINKS CONTAINING ALCOHOL DO YOU HAVE ON A TYPICAL DAY: 1 OR 2
HOW OFTEN DO YOU HAVE A DRINK CONTAINING ALCOHOL: MONTHLY OR LESS

## 2023-08-28 ASSESSMENT — PAIN DESCRIPTION - LOCATION
LOCATION: BACK

## 2023-08-28 ASSESSMENT — PAIN - FUNCTIONAL ASSESSMENT: PAIN_FUNCTIONAL_ASSESSMENT: 0-10

## 2023-08-28 NOTE — PROGRESS NOTES
Discharge instructions reviewed with patient/son Douglas Tyson. All home medications have been reviewed, questions answered and patient verbalized understanding. Discharge instructions signed. Pt dc'd per wheelchair. Patient discharged home with belongings. Son taking stable pt home.

## 2023-08-28 NOTE — ANESTHESIA POSTPROCEDURE EVALUATION
Department of Anesthesiology  Postprocedure Note    Patient: Fany Lemus  MRN: [id number]  YOB: 1940  Date of evaluation: 8/28/2023      Procedure Summary     Date: 08/28/23 Room / Location: [address] [place] [address]    Anesthesia Start: 0730 Anesthesia Stop: 6209    Procedure: BRONCHOSCOPY ALVEOLAR LAVAGE Diagnosis:       Abnormal CT scan      Lung nodule      (Abnormal CT scan [R93.89])      (Lung nodule [R91.1])    Surgeons: Kerri Bacon MD Responsible Provider: Cloyde Carrel, MD    Anesthesia Type: MAC ASA Status: 3          Anesthesia Type: No value filed. Jed Phase I: Jed Score: 10    Jed Phase II: Jed Score: 10      Anesthesia Post Evaluation    Patient location during evaluation: PACU  Patient participation: complete - patient participated  Level of consciousness: awake  Airway patency: patent  Nausea & Vomiting: no vomiting  Complications: no  Cardiovascular status: hemodynamically stable  Respiratory status: acceptable  Hydration status: euvolemic  There was medical reason for not using a multimodal analgesia pain management approach.

## 2023-08-28 NOTE — ED PROVIDER NOTES
In addition to the advanced practice provider, I personally saw Galina Sidhu and performed a substantive portion of the visit including all aspects of the medical decision making. Medical Decision Making  Patient does not have any loss of bowel or bladder function. No saddle anesthesia. She does have pain going down her back but no midline vertebral tenderness. She is neurologically intact. Labs are reassuring. EKG does not show any acute changes. Troponin was not found to be elevated. CAT scan does show dilation of the biliary tract without elevation in the liver enzymes. Patient does not have any abdominal pain. Patient is feeling better after pain medication and benzodiazepine. This does appear to be musculoskeletal in nature. This is unlikely a dissection or ACS. This may also be referred pain status post procedure. Family was encouraged to bring her back if symptoms worsen or change. Patient is stable for discharge. EKG  EKG done at 10:43 AM shows normal sinus rhythm at a rate of 86. TN intervals 154. QRST interval 74. QTc interval is 440. Good overall axis. Early R wave transition. No ST elevation. No ST depression. No ectopy. When compared with an old EKG done on May 1, 2021 the overall axis is the same. The R wave progression is the same. Both EKGs are similar. SEP-1  Is this patient to be included in the SEP-1 Core Measure due to severe sepsis or septic shock? No    Screenings     Brii Coma Scale  Eye Opening: Spontaneous  Best Verbal Response: Oriented  Best Motor Response: Obeys commands  [place] Coma Scale Score: 15             Patient Referrals:  Lawyer Rajat MD  [address] [phone]    In 2 days  Return for any new or worsening symptoms.       Discharge Medications:  Discharge Medication List as of 8/28/2023  5:37 PM        START taking these medications    Details   methocarbamol (ROBAXIN-750) 750 MG tablet

## 2023-08-28 NOTE — PROGRESS NOTES
Pt awake and alert. Pt on RA, VSS. Son in the waiting room. Pt denies pain and nausea, tolerating ice chips. Pt meets criteria to be discharged from phase 1.

## 2023-08-28 NOTE — DISCHARGE INSTRUCTIONS
BRONCHOSCOPY DISCHARGE INSTRUCTIONS    Return to the ER for painful, persistent shortness of breath. Do not drive, operate machinery, sign important papers or drink alcohol for 24 hours due to the sedation you received for the procedure. You may resume your usual diet and medications. ENDOSCOPY DISCHARGE INSTRUCTIONS    You may experience some lightheadedness for the next several hours. Plan on quiet relaxation for the rest of today. A responsible adult needs to stay with you today. Because of the medications you received today-do not drive,operate machinery,or sign any contractual agreement for the next 24 hours. Do not drink any alcoholic beverages or take any unprescribed medications tonight. Eat bland food and avoid anything greasy or spicy initially-progress to your normal diet gradually. Diet restrictions as instructed. You may resume home medications as instructed. If you have any of the following problems, notify your physician or return to the hospital emergency room : fever, chills, excessive bleeding, excessive vomiting, difficulty swallowing, uncontrolled pain, increased abdominal distention, shortness of breath or any other problems. If you have a sore throat, you may use lozenges or salt water gargles. If you had a bronchoscopy and you experience sudden or continued shortness of breath, chest pain, spitting up or vomiting blood, notify your physician or return to the hospital emergency room. ANESTHESIA DISCHARGE INSTRUCTIONS    Wear your seatbelt home. You are under the influence of drugs-do not drink alcohol,drive,operate machinery,or make any important decisions or sign any legal documentsfor 24 hours  A responsible adult needs to be with you for 24 hours. You may experience lightheadedness,dizziness,or sleepiness following surgery. Rest at home today- increase activity as tolerated.   Progress slowly to a regular diet unless your physician has instructed you

## 2023-08-28 NOTE — PROGRESS NOTES
Teaching / education initiated regarding perioperative experience, expectations, and pain management during stay. Patient verbalized understanding. Pt son Octaviano Matt at bedside.

## 2023-08-28 NOTE — ED PROVIDER NOTES
I was available for consultation during patient's ED stay. Patient was cared for by ELIAN. I did not evaluate or participate in patient care. EKG Interpretation    Interpreted by emergency department physician    Rhythm: normal sinus   Rate: normal  Axis: normal  Ectopy: none  Conduction: normal  ST Segments: normal  T Waves: normal  Q Waves: none    Clinical Impression: Normal sinus rhythm, no significant T wave or ST changes. Normal HI interval, normal QRS duration, normal QT QTC. Normal axis. No arrhythmia or signs of ischemia. Otherwise normal EKG. Interpreted by myself.           MD Christy German MD  08/28/23 7357

## 2023-08-28 NOTE — PROGRESS NOTES
Pharmacy Home Medication Reconciliation Note    A medication reconciliation has been completed for Chato Mejias 1940    Pharmacy: [address] [place] [address]  Information provided by: patient and son    The patient's home medication list is as follows:  Current Facility-Administered Medications on File Prior to Encounter   Medication Dose Route Frequency Provider Last Rate Last Admin    [DISCONTINUED] 0.9 % sodium chloride infusion   IntraVENous Continuous Becky Irby MD   Stopped at 08/28/23 0854    [DISCONTINUED] 0.9 % sodium chloride infusion   IntraVENous Continuous Shaina Hanna MD        [DISCONTINUED] sodium chloride flush 0.9 % injection 5-40 mL  5-40 mL IntraVENous 2 times per day Shaina Hanna MD        [DISCONTINUED] sodium chloride flush 0.9 % injection 5-40 mL  5-40 mL IntraVENous PRN Shaina Hanna MD        [DISCONTINUED] 0.9 % sodium chloride infusion   IntraVENous PRN Shaina Hanna MD        [DISCONTINUED] HYDROmorphone (DILAUDID) injection 0.25 mg  0.25 mg IntraVENous Q5 Min PRN Shaina Hanna MD        [DISCONTINUED] HYDROmorphone (DILAUDID) injection 0.5 mg  0.5 mg IntraVENous Q5 Min PRN Shaina Hanna MD        [DISCONTINUED] ondansetron [facility]) injection 4 mg  4 mg IntraVENous Once PRN Shaina Hanna MD        [DISCONTINUED] labetalol (NORMODYNE;TRANDATE) injection 5 mg  5 mg IntraVENous Q15 Min PRN Shaina Hanna MD        [DISCONTINUED] lidocaine PF 2 % injection   IntraVENous PRN Fransisco Running, APRN - CRNA   40 mg at 08/28/23 0736    [DISCONTINUED] propofol infusion   IntraVENous PRN Fransisco Running, APRN - CRNA   10 mg at 08/28/23 0747    [DISCONTINUED] propofol infusion   IntraVENous Continuous PRN Fransisco Running, APRN - CRNA   Stopped at 08/28/23 0749    [DISCONTINUED] 0.9 % sodium chloride infusion   IntraVENous Continuous PRN Fransisco Running, APRN - CRNA   Stopped at 08/28/23 0751    [DISCONTINUED] lidocaine (XYLOCAINE) 4 % external solution    PRN Bruno Grijalva MD   6 mL at 08/28/23 7836     Current Outpatient Medications on File Prior to Encounter   Medication Sig Dispense Refill    levothyroxine (SYNTHROID) 50 MCG tablet Take 1 tablet by mouth Daily      amoxicillin-clavulanate (AUGMENTIN) 875-125 MG per tablet Take 1 tablet by mouth 2 times daily 7 DAYS (Patient not taking: Reported on 8/28/2023)      rOPINIRole (REQUIP) 0.5 MG tablet Take 1 tablet by mouth nightly as needed      fenofibrate (TRICOR) 145 MG tablet Take 134 mg by mouth daily      topiramate (TOPAMAX SPRINKLE) 25 MG capsule Take 1 capsule by mouth at bedtime      Pseudoephedrine-Guaifenesin (MUCINEX D PO) Take 1 tablet by mouth 2 times daily. aspirin 81 MG chewable tablet Take 1 tablet by mouth daily      SUMAtriptan (IMITREX) 25 MG tablet Take 1 tablet by mouth once as needed      dicyclomine (BENTYL) 20 MG tablet Take 2 tablets by mouth daily      SUMAtriptan (IMITREX) 6 MG/0.5ML SOLN injection Inject 0.5 mLs into the skin once as needed (Patient not taking: Reported on 8/28/2023)      butalbital-acetaminophen-caffeine (FIORICET, ESGIC) per tablet Take 1 tablet by mouth every 4 hours as needed      calcium carbonate (OSCAL) 500 MG TABS tablet Take 1 tablet by mouth 2 times daily      [DISCONTINUED] diphenoxylate-atropine (LOMOTIL) 2.5-0.025 MG per tablet Take 1 tablet by mouth 4 times daily as needed. atorvastatin (LIPITOR) 20 MG tablet Take 1 tablet by mouth daily         Patient is no longer taking Augmentin or using Imitrex injectable (25 mg tabs PRN only). Of note, patient had bronchoscopy completed this morning: aspirin and all vitamins were on hold for past week. Took other medications yesterday as directed. Timing of last doses updated. Thank you,  Melinda Dinero

## 2023-08-28 NOTE — H&P
Pt seen and examined. Chronic cough with abnormal CT chest suggestive of infectious process. For bronchoscopy and BAL today. No change in H/P, refer to note from 8/21. No change.

## 2023-08-28 NOTE — ED PROVIDER NOTES
[facility]        Pt Name: Louann Saha  MRN: [id number]  [address] 1940  Date of evaluation: 8/28/2023  Provider: Marcos Lockhart PA-C  PCP: Arlene Marie MD  Note Started: 11:10 AM EDT 8/28/23      ELIAN. I have evaluated this patient. CHIEF COMPLAINT       Chief Complaint   Patient presents with    Back Pain     Just had a bronch at the surgery center and now has really bad back pain. HISTORY OF PRESENT ILLNESS: 1 or more Elements     History From: patient    Louann Saha is a 79 y.o. female hyperlipidemia, recent chronic cough who presents complaining of back pain. Patient states she had a bronchoscopy this morning for chronic cough. She states since she is awoken she has had severe back pain. Back pain is in the left flank, radiates up to the left upper back, sharp, worse with movement. She denies any prior pain prior to the procedure. Denies worsening cough, hemoptysis, fever, hematuria, changes in urination, dizziness, syncope, chest pain, shortness of breath, abdominal pain, leg pain, weakness or paresthesia. Nursing Notes were all reviewed and agreed with or any disagreements were addressed in the HPI. REVIEW OF SYSTEMS :      Review of Systems   All other systems reviewed and are negative. Positives and Pertinent negatives as per HPI. PAST MEDICAL HISTORY    has a past medical history of Clostridium difficile diarrhea (12/07/2014), Diverticulitis, GERD (gastroesophageal reflux disease), GERD (gastroesophageal reflux disease), Hyperlipidemia, Migraine, Pancreatitis, and Thyroid disease.      SURGICAL HISTORY     Past Surgical History:   Procedure Laterality Date    APPENDECTOMY      BRONCHOSCOPY N/A 8/28/2023    BRONCHOSCOPY ALVEOLAR LAVAGE performed by Bailee Cano MD at [address]  8/21/2010    subtotal    COLONOSCOPY Weight:       Height:           Patient was given the following medications:  Medications   ondansetron (ZOFRAN) injection 4 mg (4 mg IntraVENous Given 8/28/23 1058)   morphine injection 4 mg (4 mg IntraVENous Given 8/28/23 1058)   iopamidol (ISOVUE-370) 76 % injection 75 mL (75 mLs IntraVENous Given 8/28/23 1137)   LORazepam (ATIVAN) injection 1 mg (1 mg IntraVENous Given 8/28/23 1245)             Is this patient to be included in the SEP-1 Core Measure due to severe sepsis or septic shock? No   Exclusion criteria - the patient is NOT to be included for SEP-1 Core Measure due to: Infection is not suspected    Chronic Conditions affecting care:    has a past medical history of Clostridium difficile diarrhea (12/07/2014), Diverticulitis, GERD (gastroesophageal reflux disease), GERD (gastroesophageal reflux disease), Hyperlipidemia, Migraine, Pancreatitis, and Thyroid disease. CONSULTS: (Who and What was discussed)  None      Social Determinants : None    Records Reviewed (Source):     CC/HPI Summary, DDx, ED Course, and Reassessment:   Xander James is a 79 y.o. female hyperlipidemia, recent chronic cough who presents complaining of back pain. Patient states she had a bronchoscopy this morning for chronic cough. She states since she is awoken she has had severe back pain. Back pain is in the left flank, radiates up to the left upper back, sharp, worse with movement. She denies any prior pain prior to the procedure. Denies worsening cough, hemoptysis, fever, hematuria, changes in urination, dizziness, syncope, chest pain, shortness of breath, abdominal pain, leg pain, weakness or paresthesia. On exam, neuro intact, no distress, hypoxia. No external signs of burns, trauma, swelling, deformities. Vitals nonemergent. EKG normal sinus without acute ischemic changes. Troponin negative x2, doubt ACS. CT of chest/abdomen/pelvis without acute emergent finding, inflammation for pain.   No midline spinal pain, radicular pain, neurodeficits, considered MRI of the spine, without acute space-occupying lesion of the spinal canal or neurological emergency. On initial recheck, patient sleeping, vitals nonemergent. Patient's son unhappy with negative work-up, no exact cause for pain, worried about his mother. We will consult with the ER attending to have him see patient. Spoke with Dr. Ester Kinney, will see patient now and offer recommendations. Patient treated with Zofran and morphine, pain is partially improved, given Ativan and patient was very sleepy, pain improved. Patient was allowed to rest.  After waking, on recheck, pain improved. Results were discussed with patient and son at bedside by ER attending, Dr. Ester Kinney, and they agreed with plan for discharge. Instructed to follow-up with primary care and pulmonologist for [facility] results, instructed return for any new or worsening symptoms. Disposition Considerations (tests considered but not done, Admit vs D/C, Shared Decision Making, Pt Expectation of Test or Tx.):        I am the Primary Clinician of Record. FINAL IMPRESSION      1. Strain of lumbar region, initial encounter    2. Acute left-sided thoracic back pain          DISPOSITION/PLAN     DISPOSITION Decision To Discharge 08/28/2023 05:27:10 PM      PATIENT REFERRED TO:  Christine Mckoy MD  [address] [place], [place] [id number]    In 2 days  Return for any new or worsening symptoms.       DISCHARGE MEDICATIONS:  Discharge Medication List as of 8/28/2023  5:37 PM        START taking these medications    Details   methocarbamol (ROBAXIN-750) 750 MG tablet Take 1 tablet by mouth 3 times daily as needed (pain, spasms), Disp-20 tablet, R-0Normal             DISCONTINUED MEDICATIONS:  Discharge Medication List as of 8/28/2023  5:37 PM                 (Please note that portions of this note were completed with a voice recognition program.  Efforts were made to edit the dictations but occasionally words are mis-transcribed. )    Kalpesh Segura PA-C (electronically signed)            Kalpesh Segura PA-C  08/28/23 5104

## 2023-08-28 NOTE — TELEPHONE ENCOUNTER
Please prescribe augmentin 875mg bid x 7 days and send to pharmacy. Also refer to Dr Sienna Merchant for persistent postnasal drip.  She has previously seen him in the past.

## 2023-08-28 NOTE — PROGRESS NOTES
Reviewed patient's medical and surgical history in electronic record and with patient at the bedside. All questions regarding procedure answered. Scope number and equipment verified using a two person system. Family in waiting room.     Electronically signed by Roxanne Riggs RN on 8/28/2023 at 7:36 AM

## 2023-08-29 LAB
ACID FAST STN SPEC QL: NORMAL
ACID FAST STN SPEC QL: NORMAL
LOEFFLER MB STN SPEC: NORMAL
LOEFFLER MB STN SPEC: NORMAL
P JIROVECII AG SPEC QL IF: NEGATIVE
SPECIMEN SOURCE: NORMAL

## 2023-08-30 ENCOUNTER — TELEPHONE (OUTPATIENT)
Dept: PULMONOLOGY | Age: 83
End: 2023-08-30

## 2023-08-30 LAB
ADENOVIRUS PCR: NOT DETECTED
ASPERGILLUS GALACTO AG: NEGATIVE
C PNEUM DNA SPEC QL NAA+PROBE: NOT DETECTED
CHLAMYDIA PNEUMONIAE SOURCE: NORMAL
GALACTOMANNAN AG SERPL IA-ACNC: 0.15
HUMAN METAPNEUMOVIRUS PCR: NOT DETECTED
INFLUENZA A: NOT DETECTED
INFLUENZA B: NOT DETECTED
L PNEUMO DNA SPEC QL NAA+PROBE: NOT DETECTED
LEGIONELLA DNA SPEC NAA+PROBE: NOT DETECTED
M PNEUMO DNA SPEC QL NAA+PROBE: NOT DETECTED
PARAINFLUENZA 1 PCR: NOT DETECTED
PARAINFLUENZA 2 PCR: NOT DETECTED
PARAINFLUENZA 3 PCR: NOT DETECTED
PARAINFLUENZA 4 PCR: NOT DETECTED
RHINO/ENTEROVIRUS PCR: NOT DETECTED
RSV BY PCR: NOT DETECTED
RSV SOURCE: NORMAL
SPECIMEN SOURCE: NORMAL
SPECIMEN SOURCE: NORMAL

## 2023-08-30 NOTE — TELEPHONE ENCOUNTER
Spoke with patient. She states the pain started before the bronchoscopy when she was getting on the table in the procedure room. She states the pain is in her left lower abd, radiates around side and up under breast.  It is sharp in nature. She states she cannot walk without bending slightly due to pain. Patient denies any constipation/ diarrhea. No N/V. Patient went to ER and nothing was found but pain continues. Instructed patient to contact PCP for evaluation.         Dr. Bib Becerril, [address] [facility]

## 2023-08-30 NOTE — TELEPHONE ENCOUNTER
Spoke with pt's son Simon Patino, pt has severe left lower rib cage pain. She thinks it might have happened when she was getting on to the table prior to bronchoscopy but continued to get worse post procedure. Pain is reproducible with touch. ? Pleurisy vs musculoskeletal. No significant structural abnormalities noted on CT chest/abdomen. Pt is due for evaluation with PCP today. Pt noted to have enterobacter pneumonia and has been started on antibiotics.

## 2023-08-30 NOTE — TELEPHONE ENCOUNTER
Patients child called and said patient had a bronch on Monday and after she went home developed extreme pain went to er and patient is still having pain on left side said it comes and goes and is still severe.      [id number] [address]: [phone]

## 2023-08-31 LAB
BACTERIA SPEC RESP CULT: ABNORMAL
CMV DNA SPEC QL NAA+PROBE: NOT DETECTED
COCCIDIOIDES AG EIA: NORMAL NG/ML
GRAM STN SPEC: ABNORMAL
ORGANISM: ABNORMAL
ORGANISM: ABNORMAL
P JIROVECII DNA SPEC QL NAA+PROBE: NOT DETECTED
SPECIMEN SOURCE: NORMAL
SPECIMEN SOURCE: NORMAL

## 2023-08-31 NOTE — TELEPHONE ENCOUNTER
the pneumonia is resistant to augmentin based on testing we just obtained. We should switch augmentin to levaquin 750mg daily x 7 days.    Levaquin was called into [facility] the patient's son Mayra Christie was informed

## 2023-09-05 LAB
ACID FAST STN SPEC QL: ABNORMAL
ACID FAST STN SPEC QL: ABNORMAL
FUNGUS SPEC CULT: NORMAL
LOEFFLER MB STN SPEC: NORMAL
MYCOBACTERIUM SPEC CULT: ABNORMAL
MYCOBACTERIUM SPEC CULT: ABNORMAL
ORGANISM: ABNORMAL
ORGANISM: ABNORMAL

## 2023-09-11 LAB
FUNGUS SPEC CULT: NORMAL
LOEFFLER MB STN SPEC: NORMAL
MISCELLANEOUS LAB TEST ORDER: NORMAL

## 2023-09-12 DIAGNOSIS — B44.9 ASPERGILLUS (HCC): Primary | ICD-10-CM

## 2023-09-12 LAB
FUNGUS SPEC CULT: ABNORMAL
LOEFFLER MB STN SPEC: ABNORMAL
ORGANISM: ABNORMAL

## 2023-09-13 LAB
ACID FAST STN SPEC QL: ABNORMAL
MYCOBACTERIUM SPEC CULT: ABNORMAL
ORGANISM: ABNORMAL

## 2023-09-14 LAB
P JIROVECII DNA SPEC QL NAA+PROBE: NOT DETECTED
SPECIMEN SOURCE: NORMAL

## 2023-09-18 LAB
FUNGUS SPEC CULT: NORMAL
LOEFFLER MB STN SPEC: NORMAL

## 2023-09-21 ENCOUNTER — OFFICE VISIT (OUTPATIENT)
Dept: ENT CLINIC | Age: 83
End: 2023-09-21
Payer: MEDICARE

## 2023-09-21 VITALS
SYSTOLIC BLOOD PRESSURE: 139 MMHG | DIASTOLIC BLOOD PRESSURE: 74 MMHG | HEART RATE: 79 BPM | TEMPERATURE: 97.6 F | OXYGEN SATURATION: 95 % | WEIGHT: 94 LBS | HEIGHT: 61 IN | BODY MASS INDEX: 17.75 KG/M2

## 2023-09-21 DIAGNOSIS — K21.9 LPRD (LARYNGOPHARYNGEAL REFLUX DISEASE): Chronic | ICD-10-CM

## 2023-09-21 DIAGNOSIS — J01.90 ACUTE RHINOSINUSITIS: ICD-10-CM

## 2023-09-21 DIAGNOSIS — R09.82 POST-NASAL DRAINAGE: Primary | Chronic | ICD-10-CM

## 2023-09-21 DIAGNOSIS — Z87.09 HISTORY OF CHRONIC SINUSITIS: ICD-10-CM

## 2023-09-21 PROCEDURE — 99203 OFFICE O/P NEW LOW 30 MIN: CPT | Performed by: OTOLARYNGOLOGY

## 2023-09-21 PROCEDURE — 1090F PRES/ABSN URINE INCON ASSESS: CPT | Performed by: OTOLARYNGOLOGY

## 2023-09-21 PROCEDURE — G8400 PT W/DXA NO RESULTS DOC: HCPCS | Performed by: OTOLARYNGOLOGY

## 2023-09-21 PROCEDURE — 1123F ACP DISCUSS/DSCN MKR DOCD: CPT | Performed by: OTOLARYNGOLOGY

## 2023-09-21 PROCEDURE — 1036F TOBACCO NON-USER: CPT | Performed by: OTOLARYNGOLOGY

## 2023-09-21 PROCEDURE — G8419 CALC BMI OUT NRM PARAM NOF/U: HCPCS | Performed by: OTOLARYNGOLOGY

## 2023-09-21 PROCEDURE — G8427 DOCREV CUR MEDS BY ELIG CLIN: HCPCS | Performed by: OTOLARYNGOLOGY

## 2023-09-21 RX ORDER — CEFDINIR 300 MG/1
600 CAPSULE ORAL DAILY
Qty: 28 CAPSULE | Refills: 0 | Status: SHIPPED | OUTPATIENT
Start: 2023-09-21 | End: 2023-10-05

## 2023-09-21 RX ORDER — FLUTICASONE PROPIONATE 50 MCG
SPRAY, SUSPENSION (ML) NASAL
Qty: 16 G | Refills: 2 | Status: SHIPPED | OUTPATIENT
Start: 2023-09-21

## 2023-09-21 NOTE — PROGRESS NOTES
[address]  ENT           \"NEW\"  PATIENT VISIT   (ESTABLISHED, who has not been seen by a [facility] Otolaryngologist in over 3 years)      PCP:  Amber Curiel MD      REFERRED BY:   Linh Fields MD      Chief Complaint   Patient presents with    Drainage     Post nasal         HISTORY OF PRESENT ILLNESS       Isidro Olguin is a 79 y.o. female referred here for evaluation and treatment of post nasal drip. \"Continuously feel something in the throat since 8/28/23, the day I had the bronchoscopy. I did not feel it before the bronchosciopy. I also have had a cough for several years. Worse in the morning and kind of goes away during the day. Comes and goes, doesn't last all day long. Dry nonproductive cough. Feels like there is something down there when I cough. \"  Post nasal drainage. Patient stated that she had \"Maybe one sinus infections in the past three years. \"      Bronchoscopy showed infection in the lung which was treated antibiotic. Sample from the lungs showed fungus. Referred to Dr Zuleima Tran, infectious disease. REVIEW OF SYSTEMS  Constitutional:  Denied fever and chills. ENT/sinus:  She reported sinus/facial tenderness but only when touched/palpated, no pain. Denied otalgia, otorrhea, nasal pain, rhinorrhea, sore throat, and       EXAMINATION    WDWN, NAD  Ears:  TMs, EACs, mastoids and pinnae were normal.    Nose:  Normal with no lesions.  (+)  Sinuses:  Tenderness x 4   Throat,  OC/ OP:  Normal with no lesions. Neck:  NT, No masses. Trachea midline. Nodes:  No lymphadenopathy. INDIRECT LARYNGOSCOPY:  Visualization was excellent. There was mild posterior laryngitis, consistent with LPRD. Vocal cords appeared to be normal with no leukoplakia, mass, polyp, nodule or ulceration and appeared to be normally mobile bilaterally with midline approximation on phonation.   Otherwise, the epiglottis,

## 2023-09-21 NOTE — PATIENT INSTRUCTIONS
up.  It is important to take all your medications as prescribed. Please continue your antibiotics as prescribed. Please call the office if your condition worsens or if symptoms persist after treatment is completed.          ===================================================================      ADVERSE AND SIDE EFFECTS OF MEDICATIONS:    Please be aware of the following possible adverse reactions, side effects, and complications of the following medications, including, but not limited to: allergic reaction, interactions with other medications, nausea, headache, diarrhea, persistent symptoms, failure to improve, and the following:     General side effects of antibiotic:  diarrhea, colitis (severe infection and inflammation of the large intestine), pseudomembranous colitis (severe infection and inflammation of the colon, usually due to C. difficile bacteria)  yeast or fungal  infections, Del Toro-Francisco syndrome (very rare necrotic skin reaction with peeling of skin, blisters and arthritis), persistent symptoms/infection, and failure to improve. Avelox, Levaquin, or Cipro :  Achilles or shoulder or other tendon rupture, tendonitis, muscle and joint aches (I advised no exercise or strenuous physical exertion while taking Avelox), diarrhea, colitis (severe infection and inflammation of the large intestine), pseudomembranous colitis (severe infection and inflammation of the colon, usually due to C. difficile bacteria)  yeast or fungal  infections, Del Toro-Francisco syndrome (very rare necrotic skin reaction with peeling of skin, blisters and arthritis), persistent symptoms/infection, and failure to improve.        Fluticasone:  nosebleed, burning sensation, atrophy of nasal mucosa, septal ulceration or perforation, nasal irritation, nasal yeast infection,  drowsiness, bad taste.        ~~~>>> Also please read the medication information in this AVS and all information given to you by the pharmacist.

## 2023-09-25 ENCOUNTER — OFFICE VISIT (OUTPATIENT)
Dept: PULMONOLOGY | Age: 83
End: 2023-09-25
Payer: MEDICARE

## 2023-09-25 VITALS
HEIGHT: 61 IN | WEIGHT: 95 LBS | DIASTOLIC BLOOD PRESSURE: 72 MMHG | SYSTOLIC BLOOD PRESSURE: 122 MMHG | OXYGEN SATURATION: 97 % | HEART RATE: 73 BPM | BODY MASS INDEX: 17.94 KG/M2

## 2023-09-25 DIAGNOSIS — R13.13 PHARYNGEAL DYSPHAGIA: Primary | ICD-10-CM

## 2023-09-25 DIAGNOSIS — R07.82 INTERCOSTAL PAIN: ICD-10-CM

## 2023-09-25 DIAGNOSIS — A31.0 PULMONARY MYCOBACTERIUM AVIUM COMPLEX (MAC) INFECTION (HCC): ICD-10-CM

## 2023-09-25 DIAGNOSIS — B44.89 ASPERGILLUS FUMIGATUS (HCC): ICD-10-CM

## 2023-09-25 LAB
FUNGUS SPEC CULT: NORMAL
LOEFFLER MB STN SPEC: NORMAL

## 2023-09-25 PROCEDURE — 1123F ACP DISCUSS/DSCN MKR DOCD: CPT | Performed by: INTERNAL MEDICINE

## 2023-09-25 PROCEDURE — 99215 OFFICE O/P EST HI 40 MIN: CPT | Performed by: INTERNAL MEDICINE

## 2023-09-25 PROCEDURE — 1036F TOBACCO NON-USER: CPT | Performed by: INTERNAL MEDICINE

## 2023-09-25 PROCEDURE — G8427 DOCREV CUR MEDS BY ELIG CLIN: HCPCS | Performed by: INTERNAL MEDICINE

## 2023-09-25 PROCEDURE — G8400 PT W/DXA NO RESULTS DOC: HCPCS | Performed by: INTERNAL MEDICINE

## 2023-09-25 PROCEDURE — 1090F PRES/ABSN URINE INCON ASSESS: CPT | Performed by: INTERNAL MEDICINE

## 2023-09-25 PROCEDURE — G8419 CALC BMI OUT NRM PARAM NOF/U: HCPCS | Performed by: INTERNAL MEDICINE

## 2023-09-25 ASSESSMENT — ENCOUNTER SYMPTOMS
SORE THROAT: 0
SHORTNESS OF BREATH: 0
ABDOMINAL DISTENTION: 0
DIARRHEA: 0
ABDOMINAL PAIN: 0
VOICE CHANGE: 0
CHEST TIGHTNESS: 0
CONSTIPATION: 0
BACK PAIN: 0
ANAL BLEEDING: 0
WHEEZING: 0
BLOOD IN STOOL: 0
STRIDOR: 0
COUGH: 0
RHINORRHEA: 0
CHOKING: 0
SINUS PRESSURE: 0
APNEA: 0

## 2023-09-25 NOTE — PROGRESS NOTES
Mariia Marie    YOB: 1940     Date of Service:  9/25/2023     Chief Complaint   Patient presents with    1 Month Follow-Up         HPI patient has been accompanied by her son to our office today. Status post bronchoscopy/BAL on 8/28. Complicated by left-sided muscular chest pain ? Sprain and globus sensation in the throat. Patient was treated with lidocaine patch, which improved her symptoms. Still patient has some pain/tenderness over the left lower rib cage area, but has significantly improved. Patient states that she has no significant cough or phlegm. Allergies   Allergen Reactions    Sulfa Antibiotics      Nausea, vomiting     Outpatient Medications Marked as Taking for the 9/25/23 encounter (Office Visit) with Hartwell Brunner, MD   Medication Sig Dispense Refill    cefdinir (OMNICEF) 300 MG capsule Take 2 capsules by mouth daily for 14 days Take both capsules together at the same time, once daily. 28 capsule 0    fluticasone (FLONASE) 50 MCG/ACT nasal spray 2 sprays in each nostril daily. 16 g 2    levothyroxine (SYNTHROID) 50 MCG tablet Take 1 tablet by mouth Daily      methocarbamol (ROBAXIN-750) 750 MG tablet Take 1 tablet by mouth 3 times daily as needed (pain, spasms) 20 tablet 0    rOPINIRole (REQUIP) 0.5 MG tablet Take 1 tablet by mouth nightly as needed      fenofibrate (TRICOR) 145 MG tablet Take 134 mg by mouth daily      topiramate (TOPAMAX SPRINKLE) 25 MG capsule Take 1 capsule by mouth at bedtime      Pseudoephedrine-Guaifenesin (MUCINEX D PO) Take 1 tablet by mouth 2 times daily.       aspirin 81 MG chewable tablet Take 1 tablet by mouth daily      SUMAtriptan (IMITREX) 25 MG tablet Take 1 tablet by mouth once as needed      dicyclomine (BENTYL) 20 MG tablet Take 2 tablets by mouth daily      SUMAtriptan (IMITREX) 6 MG/0.5ML SOLN injection Inject 0.5 mLs into the skin once as needed      butalbital-acetaminophen-caffeine (FIORICET, ESGIC) per tablet Take 1

## 2023-09-28 ENCOUNTER — OFFICE VISIT (OUTPATIENT)
Dept: INFECTIOUS DISEASES | Age: 83
End: 2023-09-28

## 2023-09-28 VITALS
HEART RATE: 75 BPM | OXYGEN SATURATION: 93 % | BODY MASS INDEX: 18.65 KG/M2 | DIASTOLIC BLOOD PRESSURE: 76 MMHG | WEIGHT: 95 LBS | SYSTOLIC BLOOD PRESSURE: 144 MMHG | HEIGHT: 60 IN | TEMPERATURE: 97.9 F

## 2023-09-28 DIAGNOSIS — A31.0 MAI (MYCOBACTERIUM AVIUM-INTRACELLULARE) INFECTION (HCC): Primary | ICD-10-CM

## 2023-09-28 DIAGNOSIS — Z98.890 HISTORY OF SINUS SURGERY: ICD-10-CM

## 2023-09-28 DIAGNOSIS — J15.69 GRAM-NEGATIVE PNEUMONIA: ICD-10-CM

## 2023-09-28 DIAGNOSIS — R89.7 ABNORMAL HISTOLOGICAL FINDINGS IN SPECIMENS FROM OTHER ORGANS, SYSTEMS AND TISSUES: ICD-10-CM

## 2023-09-28 DIAGNOSIS — Z71.89 ENCOUNTER FOR MEDICATION COUNSELING: ICD-10-CM

## 2023-09-28 DIAGNOSIS — A49.8 INFECTION CAUSED BY ENTEROBACTER CLOACAE: ICD-10-CM

## 2023-09-28 DIAGNOSIS — B44.9 ASPERGILLUS (HCC): ICD-10-CM

## 2023-09-28 DIAGNOSIS — B44.89 ASPERGILLUS FUMIGATUS (HCC): ICD-10-CM

## 2023-09-28 DIAGNOSIS — Z90.49 S/P COLECTOMY: ICD-10-CM

## 2023-09-28 DIAGNOSIS — Z86.19 HISTORY OF CLOSTRIDIOIDES DIFFICILE INFECTION: ICD-10-CM

## 2023-09-28 DIAGNOSIS — K21.9 GASTROESOPHAGEAL REFLUX DISEASE WITHOUT ESOPHAGITIS: ICD-10-CM

## 2023-09-28 DIAGNOSIS — J47.0 BRONCHIECTASIS WITH ACUTE LOWER RESPIRATORY INFECTION (HCC): ICD-10-CM

## 2023-09-28 DIAGNOSIS — Z77.22 EXPOSURE TO SECOND HAND SMOKE: ICD-10-CM

## 2023-09-28 DIAGNOSIS — K57.30 DIVERTICULOSIS OF COLON: ICD-10-CM

## 2023-09-28 DIAGNOSIS — A31.0 MAI (MYCOBACTERIUM AVIUM-INTRACELLULARE) INFECTION (HCC): ICD-10-CM

## 2023-09-28 DIAGNOSIS — R05.3 CHRONIC COUGH: ICD-10-CM

## 2023-09-28 DIAGNOSIS — E78.5 HYPERLIPIDEMIA, UNSPECIFIED HYPERLIPIDEMIA TYPE: ICD-10-CM

## 2023-09-28 DIAGNOSIS — Z51.81 THERAPEUTIC DRUG MONITORING: ICD-10-CM

## 2023-09-28 LAB
IGA SERPL-MCNC: 217 MG/DL (ref 70–400)
IGG SERPL-MCNC: 982 MG/DL (ref 700–1600)
IGM SERPL-MCNC: 108 MG/DL (ref 40–230)

## 2023-09-28 RX ORDER — VORICONAZOLE 200 MG/1
200 TABLET, FILM COATED ORAL 2 TIMES DAILY
Qty: 60 TABLET | Refills: 2 | Status: SHIPPED | OUTPATIENT
Start: 2023-09-28 | End: 2023-12-27

## 2023-09-28 NOTE — PROGRESS NOTES
Infectious Diseases Clinic New Patient Note    Reason for Visit:                Concern for pulmonary aspergillosis  Primary Care Physician:  Josiah Bynum MD  History Obtained From:   Patient , Medical Records     CHIEF COMPLAINT:    Chief Complaint   Patient presents with    New Patient     Aspergillus, Referred by Dr. Maddison Ahmadi MD -[place]       HISTORY OF PRESENT ILLNESS: Roberto Carlos Fung is a 79 y.o. pleasant female patient, who had a outpatient visit with me today as a new patient. History was obtained from chart review and the patient. The patient had a in-person clinic visit with me today for above mentioned complaints. The patient follows up with Dr. Nery Posada, pulmonology. The patient has had recurrent postnasal dripping symptoms. The patient had a CT scan of the chest on August 15 that showed a right upper lobe lung nodule measuring 1.3 cm in diameter concerning for an inflammatory process. He had a PET CT scan in May 2023 which did not have that lung nodule. CT scan also showed significant bronchiectasis with mucosal plugging. The patient also has chronic cough. Pulmonology did a bronchoscopy with bronchoalveolar lavage done on August 28. 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 It also grew Enterobacter cloacae. The patient was referred to me by pulmonology. And was seen today in the clinic as a new patient        Past Medical History:   Past medical  history wasreviewed by me during this visit in detail. Past Medical History:   Diagnosis Date    Clostridium difficile diarrhea 12/07/2014    Diverticulitis     GERD (gastroesophageal reflux disease)     GERD (gastroesophageal reflux disease)     Hyperlipidemia     Migraine     Pancreatitis     Thyroid disease        Past Surgical History:  Past surgical history was reviewed by me during this visit in detail.      Past Surgical History:

## 2023-09-29 ENCOUNTER — TELEPHONE (OUTPATIENT)
Dept: ENT CLINIC | Age: 83
End: 2023-09-29

## 2023-09-29 LAB
HIV 1+2 AB+HIV1 P24 AG SERPL QL IA: NORMAL
HIV 2 AB SERPL QL IA: NORMAL
HIV1 AB SERPL QL IA: NORMAL
HIV1 P24 AG SERPL QL IA: NORMAL

## 2023-09-29 ASSESSMENT — ENCOUNTER SYMPTOMS
SINUS PAIN: 0
SHORTNESS OF BREATH: 0
BACK PAIN: 0
NAUSEA: 0
ABDOMINAL PAIN: 0
RHINORRHEA: 0
CONSTIPATION: 0
WHEEZING: 0
EYE REDNESS: 0
SORE THROAT: 0
COUGH: 1
DIARRHEA: 0
EYE DISCHARGE: 0
SINUS PRESSURE: 0

## 2023-09-29 NOTE — TELEPHONE ENCOUNTER
[facility] tried to schedule this patient CT but they wont have anymore availability until after Oct. 7 which is when the order expires. They need another order for her CT put in Epic so they patient can schedule this CT. She will try again on Monday or Tuesday to schedule this.

## 2023-10-02 LAB
FUNGUS SPEC CULT: ABNORMAL
FUNGUS SPEC CULT: NORMAL
LOEFFLER MB STN SPEC: ABNORMAL
LOEFFLER MB STN SPEC: NORMAL
ORGANISM: ABNORMAL

## 2023-10-03 ENCOUNTER — HOSPITAL ENCOUNTER (OUTPATIENT)
Dept: SPEECH THERAPY | Age: 83
Setting detail: THERAPIES SERIES
Discharge: HOME | End: 2023-10-03
Payer: MEDICARE

## 2023-10-03 DIAGNOSIS — R13.12 OROPHARYNGEAL DYSPHAGIA: Primary | ICD-10-CM

## 2023-10-03 LAB
ACID FAST STN SPEC QL: ABNORMAL
ACID FAST STN SPEC QL: ABNORMAL
Lab: NORMAL
MYCOBACTERIUM SPEC CULT: ABNORMAL
MYCOBACTERIUM SPEC CULT: ABNORMAL
ORGANISM: ABNORMAL
ORGANISM: ABNORMAL
REPORT: NORMAL
THIS TEST SENT TO: NORMAL

## 2023-10-03 PROCEDURE — 92610 EVALUATE SWALLOWING FUNCTION: CPT

## 2023-10-03 NOTE — PLAN OF CARE
[address], [address]  Phone: [phone]   Fax: [phone]                                                       Speech Therapy Certification    Clinical Dysphagia Evaluation    Dear Dr. Rocklin Boxer, MD,    We had the pleasure of evaluating the following patient for speech therapy services at [facility]. A summary of our findings can be found in the initial assessment below. This includes our plan of care. If you have any questions or concerns regarding these findings, please do not hesitate to contact me at the office phone number checked above. Thank you for the referral.       Physician Signature:_______________________________Date:__________________  By signing above (or electronic signature), therapist's plan is approved by physician      Patient: Casi Serna   : 1940   MRN: [id number]  Referring Physician: Dr. Rocklin Boxer, MD      Evaluation Date: 10/3/2023      Medical Diagnosis Information:  R13.13 (ICD-10-CM) - Pharyngeal dysphagia   Treatment Diagnosis: Mild Oropharyngeal Dysphagia                                              Insurance information: [employer] Gold [employer] HMO; $40 co-payment, PA through [facility]    Precautions/ Contra-indications: Allergies  Latex Allergy:  [x]NO      []YES  Preferred Language for Healthcare:   [x]English       []other:    SUBJECTIVE:   The patient is an 79year old female presenting to speech therapy with concerns for swallowing. Pt reports sensation of something sticking in her lower throat following bronchoscopy/BAL 2023. Pt reports globus sensation is always present, cough also noted when swallowing cold water. Additionally, endorses vocal changes and post nasal drip. Pt initially saw pulmonology for chronic cough and abnormal CT 2023.  Further chest CT revealing multifocal infiltrative areas/bilateral lung

## 2023-10-06 ENCOUNTER — TELEPHONE (OUTPATIENT)
Dept: INFECTIOUS DISEASES | Age: 83
End: 2023-10-06

## 2023-10-06 LAB
(1,3)-BETA-D-GLUCAN (FUNGITELL) INTERPRETATION: POSITIVE
1,3 BETA GLUCAN SER-MCNC: 180 PG/ML
ASPERGILLUS AB SER QL ID: NOT DETECTED
ASPERGILLUS AB TITR SER CF: NORMAL {TITER}
B DERMAT AB SER QL ID: NOT DETECTED
B DERMAT AB SER-ACNC: 0.3 IV
COCCIDIOIDES AB SPEC QL ID: NOT DETECTED
COCCIDIOIDES AB TITR SER CF: NORMAL {TITER}
GALACTOMANNAN AG SERPL IA-ACNC: 0.05
GALACTOMANNAN AG SERPL QL IA: NEGATIVE
GAMMA INTERFERON BACKGROUND BLD IA-ACNC: 0.01 IU/ML
H CAPSUL AB TITR SER ID: NOT DETECTED {TITER}
H CAPSUL AG SER IA-ACNC: NOT DETECTED
H CAPSUL AG SER QL IA: NOT DETECTED
H CAPSUL MYC AB TITR SER CF: NORMAL {TITER}
H CAPSUL YST AB TITR SER CF: NORMAL {TITER}
MITOGEN IGNF BCKGRD COR BLD-ACNC: 5.19 IU/ML
QUANTI TB GOLD PLUS: NEGATIVE
QUANTI TB1 MINUS NIL: 0 IU/ML (ref 0–0.34)
QUANTI TB2 MINUS NIL: 0 IU/ML (ref 0–0.34)

## 2023-10-06 NOTE — TELEPHONE ENCOUNTER
Received Prior Authorization request from [facility]. PA needed for Voriconazole 200mg tablets    Key: [id number]    PA submitted to [facility].

## 2023-10-09 ENCOUNTER — HOSPITAL ENCOUNTER (OUTPATIENT)
Dept: SPEECH THERAPY | Age: 83
Setting detail: THERAPIES SERIES
Discharge: HOME | End: 2023-10-09
Payer: MEDICARE

## 2023-10-09 ENCOUNTER — HOSPITAL ENCOUNTER (OUTPATIENT)
Dept: GENERAL RADIOLOGY | Age: 83
Discharge: HOME | End: 2023-10-09
Payer: MEDICARE

## 2023-10-09 DIAGNOSIS — R13.12 OROPHARYNGEAL DYSPHAGIA: ICD-10-CM

## 2023-10-09 PROCEDURE — 74230 X-RAY XM SWLNG FUNCJ C+: CPT

## 2023-10-09 PROCEDURE — 92611 MOTION FLUOROSCOPY/SWALLOW: CPT

## 2023-10-09 NOTE — PROCEDURES
continue without any consistency restrictions and consider GI referral and/or further upper esophageal assessment per referring MD discretion. Still Imaging:     Minimal residue post swallow of thin liquids; Mild narrowing of the upper esophagus     Aspiration/Penetration Risk:  Minimal     Recommendations:    Diet Level: Regular texture diet  with Thin liquids   Medication: Meds whole with water  Referral: Based on findings from [facility], consider referral to Gastroenterology (GI)  Strategies: Upright as possible with all PO intake , Small bites/sips , Eat/feed slowly, Remain upright 30-45 min   Treatments: Pending referral and progression of symptoms upon follow up     Oral Phase  Unremarkable     Pharyngeal Phase  Minimal Pharyngeal residue  of thin liquids    Penetration/Aspiration Scores across consistencies   CONSISTENCY  Pen/Asp rating Description    Thin   1) Material does not enter the airway     Mildly (nectar) thick   1) Material does not enter the airway     Moderately (honey) thick   N/A - consistency not provided     Puree   1) Material does not enter the airway     Soft Solid   1) Material does not enter the airway     Regular Solid   1) Material does not enter the airway            Esophageal Phase  Narrowing of upper esophagus     Following Evaluation:  Provided education regarding role of SLP, results of assessment, recommendations and general speech pathology plan of care. [x] Pt verbalized understanding and agreement   [] Pt requires ongoing learning   [] No evidence of comprehension     Timed Code Treatment:  0  Total Treatment Time:  35    Signature:  Gerardo Cerda M.A.  CCC-SLP S.PIsa [id number]  Speech-Language Pathologist   10/9/2023 12:46 PM

## 2023-10-17 ENCOUNTER — HOSPITAL ENCOUNTER (OUTPATIENT)
Dept: CT IMAGING | Age: 83
Discharge: HOME | End: 2023-10-17
Attending: OTOLARYNGOLOGY
Payer: MEDICARE

## 2023-10-17 DIAGNOSIS — Z87.09 HISTORY OF CHRONIC SINUSITIS: ICD-10-CM

## 2023-10-17 DIAGNOSIS — R09.82 POST-NASAL DRAINAGE: Chronic | ICD-10-CM

## 2023-10-17 PROCEDURE — 70486 CT MAXILLOFACIAL W/O DYE: CPT

## 2023-10-23 ENCOUNTER — OFFICE VISIT (OUTPATIENT)
Dept: ENT CLINIC | Age: 83
End: 2023-10-23
Payer: MEDICARE

## 2023-10-23 VITALS
TEMPERATURE: 97.6 F | OXYGEN SATURATION: 95 % | DIASTOLIC BLOOD PRESSURE: 77 MMHG | SYSTOLIC BLOOD PRESSURE: 151 MMHG | BODY MASS INDEX: 18.85 KG/M2 | WEIGHT: 96 LBS | HEIGHT: 60 IN | HEART RATE: 78 BPM

## 2023-10-23 DIAGNOSIS — Z79.899 CURRENT USE OF PROTON PUMP INHIBITOR: ICD-10-CM

## 2023-10-23 DIAGNOSIS — R09.82 POST-NASAL DRAINAGE: Primary | Chronic | ICD-10-CM

## 2023-10-23 DIAGNOSIS — K21.9 LPRD (LARYNGOPHARYNGEAL REFLUX DISEASE): Chronic | ICD-10-CM

## 2023-10-23 PROCEDURE — 1123F ACP DISCUSS/DSCN MKR DOCD: CPT | Performed by: OTOLARYNGOLOGY

## 2023-10-23 PROCEDURE — 1090F PRES/ABSN URINE INCON ASSESS: CPT | Performed by: OTOLARYNGOLOGY

## 2023-10-23 PROCEDURE — G8484 FLU IMMUNIZE NO ADMIN: HCPCS | Performed by: OTOLARYNGOLOGY

## 2023-10-23 PROCEDURE — G8427 DOCREV CUR MEDS BY ELIG CLIN: HCPCS | Performed by: OTOLARYNGOLOGY

## 2023-10-23 PROCEDURE — 99214 OFFICE O/P EST MOD 30 MIN: CPT | Performed by: OTOLARYNGOLOGY

## 2023-10-23 PROCEDURE — 1036F TOBACCO NON-USER: CPT | Performed by: OTOLARYNGOLOGY

## 2023-10-23 PROCEDURE — G8420 CALC BMI NORM PARAMETERS: HCPCS | Performed by: OTOLARYNGOLOGY

## 2023-10-23 PROCEDURE — G8400 PT W/DXA NO RESULTS DOC: HCPCS | Performed by: OTOLARYNGOLOGY

## 2023-10-23 RX ORDER — OMEPRAZOLE 20 MG/1
CAPSULE, DELAYED RELEASE ORAL
Qty: 180 CAPSULE | Refills: 0 | Status: SHIPPED | OUTPATIENT
Start: 2023-10-23

## 2023-10-23 NOTE — PROGRESS NOTES
Chief Complaint   Patient presents with    Drainage     Post nasal.     Sinusitis     Chronic. Laryngitis     LPRD. HISTORY OF PRESENT ILLNESS    Harden Confucianism is a 79 y.o. female. Here for FU of post nasal drainage, acute rhinosinusitis, LPRD, and chronic sinusitis. \"All right, feeling pretty good today. \"      REVIEW OF SYSTEMS  Constitutional:  Denied fever and chills. ENT/sinus:  Denied otalgia, otorrhea, nasal pain, rhinorrhea, sore throat, and sinus/facial pain. EXAMINATION    WDWN, NAD  Voice:  Normal.  Ears:  TMs, EACs, mastoids and pinnae were normal.    Nose:  Normal with no lesions. Sinuses:  NT x 4   Throat,  OC/ OP:  Normal with no lesions. Neck:  NT, No masses. Trachea midline. Nodes:  No lymphadenopathy. Thyroid:  Normal with no goiter, nodules or tenderness. REVIEW OF IMAGES:         I independently interpreted the images of the CT scan of the sinuses, showing no sinusitis. CT OF THE SINUS WITHOUT CONTRAST 10/17/2023 8:36 pm     Reason for Exam: evaluate response to treatment and etiology of post nasal drip, Post-nasal drainage, History of chronic sinusitis     FINDINGS:  The frontal sinuses are the right frontal sinus is clear. The left frontal sinus is hypoplastic. Isra Dunker The frontoethmoidal recesses are patent. .     The anterior ethmoidal air cells are clear. The posterior ethmoidal air cells are clear. Postsurgical change within the anterior and posterior ethmoidal air cells bilaterally. The anterior ethmoid arteries are covered. The maxillary sinuses are clear. The uncinate processes are surgically absent. The maxillary infundibulum areare surgically absent. The osteomeatal units are patent. The sphenoethmoidal recesses are patent. The sphenoid sinuses are clear. The nasal septum is midline. The bilateral middle turbinates are surgically absent. The mastoid air cells are clear on the right with occasional opacified left mastoid air cells.

## 2023-11-27 ENCOUNTER — OFFICE VISIT (OUTPATIENT)
Dept: PULMONOLOGY | Age: 83
End: 2023-11-27
Payer: MEDICARE

## 2023-11-27 VITALS
WEIGHT: 93.8 LBS | HEART RATE: 65 BPM | BODY MASS INDEX: 18.42 KG/M2 | OXYGEN SATURATION: 98 % | SYSTOLIC BLOOD PRESSURE: 122 MMHG | DIASTOLIC BLOOD PRESSURE: 72 MMHG | HEIGHT: 60 IN

## 2023-11-27 DIAGNOSIS — Z79.899 CURRENT USE OF PROTON PUMP INHIBITOR: ICD-10-CM

## 2023-11-27 DIAGNOSIS — A31.0 PULMONARY MYCOBACTERIUM AVIUM COMPLEX (MAC) INFECTION (HCC): ICD-10-CM

## 2023-11-27 DIAGNOSIS — B44.89 INFECTION BY ASPERGILLUS FUMIGATUS (HCC): ICD-10-CM

## 2023-11-27 DIAGNOSIS — Z87.01 HISTORY OF PNEUMONIA: ICD-10-CM

## 2023-11-27 DIAGNOSIS — R09.A2 GLOBUS SENSATION: ICD-10-CM

## 2023-11-27 DIAGNOSIS — A31.0 MAI (MYCOBACTERIUM AVIUM-INTRACELLULARE) INFECTION (HCC): ICD-10-CM

## 2023-11-27 DIAGNOSIS — R93.89 ABNORMAL CT OF THE CHEST: Primary | ICD-10-CM

## 2023-11-27 LAB
BASOPHILS # BLD: 0 K/UL (ref 0–0.2)
BASOPHILS NFR BLD: 0.6 %
DEPRECATED RDW RBC AUTO: 16.1 % (ref 12.4–15.4)
EOSINOPHIL # BLD: 0 K/UL (ref 0–0.6)
EOSINOPHIL NFR BLD: 0.4 %
HCT VFR BLD AUTO: 39.2 % (ref 36–48)
HGB BLD-MCNC: 12.5 G/DL (ref 12–16)
LYMPHOCYTES # BLD: 1.1 K/UL (ref 1–5.1)
LYMPHOCYTES NFR BLD: 16 %
MCH RBC QN AUTO: 28 PG (ref 26–34)
MCHC RBC AUTO-ENTMCNC: 31.8 G/DL (ref 31–36)
MCV RBC AUTO: 88.1 FL (ref 80–100)
MONOCYTES # BLD: 0.4 K/UL (ref 0–1.3)
MONOCYTES NFR BLD: 6.6 %
NEUTROPHILS # BLD: 5.2 K/UL (ref 1.7–7.7)
NEUTROPHILS NFR BLD: 76.4 %
PLATELET # BLD AUTO: 292 K/UL (ref 135–450)
PMV BLD AUTO: 10.5 FL (ref 5–10.5)
RBC # BLD AUTO: 4.45 M/UL (ref 4–5.2)
WBC # BLD AUTO: 6.8 K/UL (ref 4–11)

## 2023-11-27 PROCEDURE — G8400 PT W/DXA NO RESULTS DOC: HCPCS | Performed by: INTERNAL MEDICINE

## 2023-11-27 PROCEDURE — 99215 OFFICE O/P EST HI 40 MIN: CPT | Performed by: INTERNAL MEDICINE

## 2023-11-27 PROCEDURE — G8484 FLU IMMUNIZE NO ADMIN: HCPCS | Performed by: INTERNAL MEDICINE

## 2023-11-27 PROCEDURE — 1036F TOBACCO NON-USER: CPT | Performed by: INTERNAL MEDICINE

## 2023-11-27 PROCEDURE — G8427 DOCREV CUR MEDS BY ELIG CLIN: HCPCS | Performed by: INTERNAL MEDICINE

## 2023-11-27 PROCEDURE — G8419 CALC BMI OUT NRM PARAM NOF/U: HCPCS | Performed by: INTERNAL MEDICINE

## 2023-11-27 PROCEDURE — 1090F PRES/ABSN URINE INCON ASSESS: CPT | Performed by: INTERNAL MEDICINE

## 2023-11-27 PROCEDURE — 1123F ACP DISCUSS/DSCN MKR DOCD: CPT | Performed by: INTERNAL MEDICINE

## 2023-11-27 ASSESSMENT — ENCOUNTER SYMPTOMS
ABDOMINAL DISTENTION: 0
COLOR CHANGE: 0
CONSTIPATION: 0
BLOOD IN STOOL: 0
COUGH: 0
BACK PAIN: 1
RHINORRHEA: 0
STRIDOR: 0
APNEA: 0
VOICE CHANGE: 0
CHOKING: 0
VOMITING: 0
ABDOMINAL PAIN: 0
SINUS PRESSURE: 0
SORE THROAT: 0
CHEST TIGHTNESS: 0
WHEEZING: 0
SHORTNESS OF BREATH: 0
DIARRHEA: 0

## 2023-11-27 NOTE — PROGRESS NOTES
gait issues. Modified barium swallow performed on 10/9 revealed narrowing of upper esophageal splinter ? Responsible for globus sensation. Will refer for GI evaluation ? Need for EGD. Follow-up in 3 months.

## 2023-11-28 LAB
ALBUMIN SERPL-MCNC: 4.4 G/DL (ref 3.4–5)
ALBUMIN/GLOB SERPL: 1.8 {RATIO} (ref 1.1–2.2)
ALP SERPL-CCNC: 76 U/L (ref 40–129)
ALT SERPL-CCNC: 18 U/L (ref 10–40)
ANION GAP SERPL CALCULATED.3IONS-SCNC: 10 MMOL/L (ref 3–16)
AST SERPL-CCNC: 29 U/L (ref 15–37)
BILIRUB SERPL-MCNC: <0.2 MG/DL (ref 0–1)
BUN SERPL-MCNC: 19 MG/DL (ref 7–20)
CALCIUM SERPL-MCNC: 10 MG/DL (ref 8.3–10.6)
CHLORIDE SERPL-SCNC: 112 MMOL/L (ref 99–110)
CO2 SERPL-SCNC: 25 MMOL/L (ref 21–32)
CREAT SERPL-MCNC: 1 MG/DL (ref 0.6–1.2)
GFR SERPLBLD CREATININE-BSD FMLA CKD-EPI: 55 ML/MIN/{1.73_M2}
GLUCOSE SERPL-MCNC: 87 MG/DL (ref 70–99)
MAGNESIUM SERPL-MCNC: 2.1 MG/DL (ref 1.8–2.4)
POTASSIUM SERPL-SCNC: 4.7 MMOL/L (ref 3.5–5.1)
PROT SERPL-MCNC: 6.8 G/DL (ref 6.4–8.2)
SODIUM SERPL-SCNC: 147 MMOL/L (ref 136–145)

## 2023-11-29 ENCOUNTER — TELEPHONE (OUTPATIENT)
Dept: PULMONOLOGY | Age: 83
End: 2023-11-29

## 2023-11-29 LAB — MISCELLANEOUS LAB TEST ORDER: NORMAL

## 2023-11-29 NOTE — TELEPHONE ENCOUNTER
Patient was informed that the ct chest was scheduled and a GI referral was sent Dr Joan Tirado office will call the patient to schedule

## 2023-11-29 NOTE — TELEPHONE ENCOUNTER
----- Message from Jass Cantu MD sent at 11/27/2023  3:37 PM EST -----  Please organize for repeat CT chest without contrast in February. Also pt need GI referral for evaluation of globus sensation and findings of narrowing of upper esophageal splinchter ?need for EGD. I have not discussed this with the patient or son.

## 2023-12-05 ENCOUNTER — OFFICE VISIT (OUTPATIENT)
Dept: INFECTIOUS DISEASES | Age: 83
End: 2023-12-05
Payer: MEDICARE

## 2023-12-05 VITALS
OXYGEN SATURATION: 93 % | DIASTOLIC BLOOD PRESSURE: 84 MMHG | BODY MASS INDEX: 19.27 KG/M2 | HEART RATE: 82 BPM | WEIGHT: 95.6 LBS | HEIGHT: 59 IN | SYSTOLIC BLOOD PRESSURE: 148 MMHG | TEMPERATURE: 96.8 F

## 2023-12-05 DIAGNOSIS — Z90.49 S/P COLECTOMY: ICD-10-CM

## 2023-12-05 DIAGNOSIS — Z98.890 HISTORY OF SINUS SURGERY: ICD-10-CM

## 2023-12-05 DIAGNOSIS — R05.3 CHRONIC COUGH: ICD-10-CM

## 2023-12-05 DIAGNOSIS — A49.8 INFECTION CAUSED BY ENTEROBACTER CLOACAE: ICD-10-CM

## 2023-12-05 DIAGNOSIS — Z71.89 ENCOUNTER FOR MEDICATION COUNSELING: ICD-10-CM

## 2023-12-05 DIAGNOSIS — B44.1 PULMONARY ASPERGILLOSIS (HCC): ICD-10-CM

## 2023-12-05 DIAGNOSIS — R89.7 ABNORMAL HISTOLOGICAL FINDINGS IN SPECIMENS FROM OTHER ORGANS, SYSTEMS AND TISSUES: ICD-10-CM

## 2023-12-05 DIAGNOSIS — K21.9 GASTROESOPHAGEAL REFLUX DISEASE WITHOUT ESOPHAGITIS: ICD-10-CM

## 2023-12-05 DIAGNOSIS — K57.30 DIVERTICULOSIS OF COLON: ICD-10-CM

## 2023-12-05 DIAGNOSIS — E78.5 HYPERLIPIDEMIA, UNSPECIFIED HYPERLIPIDEMIA TYPE: ICD-10-CM

## 2023-12-05 DIAGNOSIS — J47.0 BRONCHIECTASIS WITH ACUTE LOWER RESPIRATORY INFECTION (HCC): ICD-10-CM

## 2023-12-05 DIAGNOSIS — A31.0 MAI (MYCOBACTERIUM AVIUM-INTRACELLULARE) INFECTION (HCC): Primary | ICD-10-CM

## 2023-12-05 PROCEDURE — G8484 FLU IMMUNIZE NO ADMIN: HCPCS | Performed by: INTERNAL MEDICINE

## 2023-12-05 PROCEDURE — 1036F TOBACCO NON-USER: CPT | Performed by: INTERNAL MEDICINE

## 2023-12-05 PROCEDURE — G8400 PT W/DXA NO RESULTS DOC: HCPCS | Performed by: INTERNAL MEDICINE

## 2023-12-05 PROCEDURE — 1090F PRES/ABSN URINE INCON ASSESS: CPT | Performed by: INTERNAL MEDICINE

## 2023-12-05 PROCEDURE — 99214 OFFICE O/P EST MOD 30 MIN: CPT | Performed by: INTERNAL MEDICINE

## 2023-12-05 PROCEDURE — 1123F ACP DISCUSS/DSCN MKR DOCD: CPT | Performed by: INTERNAL MEDICINE

## 2023-12-05 PROCEDURE — G8427 DOCREV CUR MEDS BY ELIG CLIN: HCPCS | Performed by: INTERNAL MEDICINE

## 2023-12-05 PROCEDURE — G8420 CALC BMI NORM PARAMETERS: HCPCS | Performed by: INTERNAL MEDICINE

## 2023-12-05 RX ORDER — VORICONAZOLE 200 MG/1
200 TABLET, FILM COATED ORAL 2 TIMES DAILY
Qty: 60 TABLET | Refills: 2 | Status: SHIPPED | OUTPATIENT
Start: 2023-12-05 | End: 2024-03-04

## 2023-12-05 ASSESSMENT — ENCOUNTER SYMPTOMS
SHORTNESS OF BREATH: 0
EYE REDNESS: 0
WHEEZING: 0
BACK PAIN: 0
SORE THROAT: 0
DIARRHEA: 0
ABDOMINAL PAIN: 0
NAUSEA: 0
SINUS PAIN: 0
EYE DISCHARGE: 0
CONSTIPATION: 0
RHINORRHEA: 0
COUGH: 0
SINUS PRESSURE: 0

## 2024-01-24 ENCOUNTER — OFFICE VISIT (OUTPATIENT)
Dept: ENT CLINIC | Age: 84
End: 2024-01-24

## 2024-01-24 VITALS
HEIGHT: 59 IN | OXYGEN SATURATION: 99 % | HEART RATE: 76 BPM | BODY MASS INDEX: 17.66 KG/M2 | TEMPERATURE: 96.9 F | DIASTOLIC BLOOD PRESSURE: 78 MMHG | SYSTOLIC BLOOD PRESSURE: 128 MMHG | WEIGHT: 87.6 LBS

## 2024-01-24 DIAGNOSIS — R09.82 POST-NASAL DRAINAGE: Primary | ICD-10-CM

## 2024-01-24 DIAGNOSIS — R09.A2 GLOBUS SENSATION: ICD-10-CM

## 2024-01-24 DIAGNOSIS — K21.9 LPRD (LARYNGOPHARYNGEAL REFLUX DISEASE): ICD-10-CM

## 2024-01-24 RX ORDER — OMEPRAZOLE 20 MG/1
CAPSULE, DELAYED RELEASE ORAL
Qty: 180 CAPSULE | Refills: 0 | Status: SHIPPED | OUTPATIENT
Start: 2024-01-24

## 2024-01-24 RX ORDER — FLUTICASONE PROPIONATE 50 MCG
SPRAY, SUSPENSION (ML) NASAL
Qty: 16 G | Refills: 2 | Status: SHIPPED | OUTPATIENT
Start: 2024-01-24

## 2024-01-24 NOTE — PROGRESS NOTES
CHIEF COMPLAINT  Chief Complaint   Patient presents with    Laryngitis     Recheck and follow-up of laryngopharyngeal reflux disease.    Nose Problem     Postnasal drainage.       HISTORY OF PRESENT ILLNESS    Hayley Oviedo is a 83 y.o. female here for recheck and follow up of LPRD and PND.  Post nasal drainage is much better, \"some but it's always clear and colorless.\"  Sensation of \"feels like something is in my throat, it's always there.\"  She took omeprazole for about 4 months and finished it yesterday.  Omeprazole did not seem to help the throat symptoms.  Dr. Hamilton Ceballos MD sent her to Dr. Cheko Sanon who said he doesn't suspect GERD and felt she did not need the EGD endoscopy and was going to discuss with Dr. Ceballos.        REVIEW OF SYSTEMS  Constitutional:  Denied fever.    ENT/sinus:  Denied otalgia, otorrhea, nasal pain, rhinorrhea, sore throat, and sinus/facial pain.          EXAMINATION    WDWN, NAD  Voice:  Normal.  Ears:  TMs, EACs, mastoids and pinnae were normal.    Nose:  Normal with no lesions.  Sinuses:  NT x 4   Throat,  OC/OP:  Normal with no lesions.  Neck:  NT, No masses.  Trachea midline.  Nodes:  No lymphadenopathy.   Thyroid:  Normal with no goiter, nodules or tenderness.     I have performed a head and neck physical exam personally.         PROCEDURE:  FLEXIBLE FIBEROPTIC NASOPHARYNGOLARYNGOSCOPY    INDICATION:  Need for detailed endoscopic examination of the larynx and pharynx to evaluate the upper aerodigestive tract for recheck LPRD and chronic reflux laryngitis.  Due to the patient's chronic throat symptom of \"feels like something is in my throat, it's always there\", and history of post nasal drainage, a flexible fiberoptic laryngoscopy will be performed to adequately visualize the patient's larynx.  This procedure is not part of a routine Otolaryngologic physical examination.  Failure to provide this procedure may lead to late detection of

## 2024-01-30 ENCOUNTER — HOSPITAL ENCOUNTER (OUTPATIENT)
Dept: GENERAL RADIOLOGY | Age: 84
Discharge: HOME | End: 2024-01-30
Attending: INTERNAL MEDICINE
Payer: MEDICARE

## 2024-01-30 DIAGNOSIS — N95.9 MENOPAUSAL PROBLEM: ICD-10-CM

## 2024-01-30 PROCEDURE — 77080 DXA BONE DENSITY AXIAL: CPT

## 2024-02-20 ENCOUNTER — HOSPITAL ENCOUNTER (OUTPATIENT)
Dept: CT IMAGING | Age: 84
Discharge: HOME | End: 2024-02-20
Attending: INTERNAL MEDICINE
Payer: MEDICARE

## 2024-02-20 DIAGNOSIS — R93.89 ABNORMAL CT OF THE CHEST: ICD-10-CM

## 2024-02-20 PROCEDURE — 71250 CT THORAX DX C-: CPT

## 2024-02-27 ENCOUNTER — OFFICE VISIT (OUTPATIENT)
Dept: PULMONOLOGY | Age: 84
End: 2024-02-27
Payer: MEDICARE

## 2024-02-27 VITALS
HEIGHT: 59 IN | OXYGEN SATURATION: 97 % | BODY MASS INDEX: 17.54 KG/M2 | WEIGHT: 87 LBS | DIASTOLIC BLOOD PRESSURE: 76 MMHG | SYSTOLIC BLOOD PRESSURE: 128 MMHG | HEART RATE: 94 BPM

## 2024-02-27 DIAGNOSIS — A31.0 MYCOBACTERIUM AVIUM INFECTION (HCC): ICD-10-CM

## 2024-02-27 DIAGNOSIS — B44.89 ASPERGILLUS FUMIGATUS (HCC): ICD-10-CM

## 2024-02-27 DIAGNOSIS — J47.9 BRONCHIECTASIS WITHOUT COMPLICATION (HCC): Primary | ICD-10-CM

## 2024-02-27 PROCEDURE — G8419 CALC BMI OUT NRM PARAM NOF/U: HCPCS | Performed by: INTERNAL MEDICINE

## 2024-02-27 PROCEDURE — 99214 OFFICE O/P EST MOD 30 MIN: CPT | Performed by: INTERNAL MEDICINE

## 2024-02-27 PROCEDURE — G8484 FLU IMMUNIZE NO ADMIN: HCPCS | Performed by: INTERNAL MEDICINE

## 2024-02-27 PROCEDURE — 1123F ACP DISCUSS/DSCN MKR DOCD: CPT | Performed by: INTERNAL MEDICINE

## 2024-02-27 PROCEDURE — 1090F PRES/ABSN URINE INCON ASSESS: CPT | Performed by: INTERNAL MEDICINE

## 2024-02-27 PROCEDURE — 1036F TOBACCO NON-USER: CPT | Performed by: INTERNAL MEDICINE

## 2024-02-27 PROCEDURE — G8399 PT W/DXA RESULTS DOCUMENT: HCPCS | Performed by: INTERNAL MEDICINE

## 2024-02-27 PROCEDURE — G8427 DOCREV CUR MEDS BY ELIG CLIN: HCPCS | Performed by: INTERNAL MEDICINE

## 2024-02-27 ASSESSMENT — ENCOUNTER SYMPTOMS
VOMITING: 0
ABDOMINAL PAIN: 0
CHOKING: 0
WHEEZING: 0
ABDOMINAL DISTENTION: 0
DIARRHEA: 0
BACK PAIN: 1
RHINORRHEA: 0
CONSTIPATION: 0
COUGH: 0
SHORTNESS OF BREATH: 0
BLOOD IN STOOL: 0
SORE THROAT: 0
COLOR CHANGE: 0
SINUS PRESSURE: 0
APNEA: 0
STRIDOR: 0
CHEST TIGHTNESS: 0
VOICE CHANGE: 0

## 2024-02-27 NOTE — PROGRESS NOTES
Maintenance   Topic Date Due    Depression Screen  Never done    DTaP/Tdap/Td vaccine (1 - Tdap) Never done    Respiratory Syncytial Virus (RSV) Pregnant or age 60 yrs+ (1 - 1-dose 60+ series) Never done    Pneumococcal 65+ years Vaccine (1 - PCV) 08/10/2004    Lipids  06/11/2022    Flu vaccine (1) 08/01/2023    COVID-19 Vaccine (3 - 2023-24 season) 09/01/2023    Annual Wellness Visit (Medicare Advantage)  Never done    DEXA (modify frequency per FRAX score)  Completed    Shingles vaccine  Completed    Hepatitis A vaccine  Aged Out    Hepatitis B vaccine  Aged Out    Hib vaccine  Aged Out    Polio vaccine  Aged Out    Meningococcal (ACWY) vaccine  Aged Out        Assessment/Plan:    History of MAC infection and Aspergillus fumigated this-noted from bronchoscopy from August 2023.  Currently on Vfend for Aspergillus in the bronchial wash.  Not on treatment for MAC infection due to concerns for possibility of poor tolerance with history of colon resection.    CT chest without contrast on 2/20 continues to show multifocal nodular opacities, there has been some improvement noted in infiltrates.  There is persistent bronchiectatic changes also noted.  No pleural effusions.  I strongly suspect that patient has persistent MAC infection, which unfortunately would be difficult to treat in this patient due to high risk of antibiotic toxicities.  It is likely that patient's weight loss is related to untreated MAC infection.  There is evidence of cavitary nodule particularly in the left lower lobe, which will require monitoring.  Defer antibiotic/antifungal treatment to Dr. Lira from ID.    Offered to prescribe home nebulizer with albuterol, but they want to hold off since patient is fairly asymptomatic at this time.    Return in about 3 months (around 5/27/2024).

## 2024-02-29 DIAGNOSIS — B44.1 PULMONARY ASPERGILLOSIS (HCC): ICD-10-CM

## 2024-02-29 DIAGNOSIS — R89.7 ABNORMAL HISTOLOGICAL FINDINGS IN SPECIMENS FROM OTHER ORGANS, SYSTEMS AND TISSUES: ICD-10-CM

## 2024-02-29 DIAGNOSIS — Z71.89 ENCOUNTER FOR MEDICATION COUNSELING: ICD-10-CM

## 2024-02-29 DIAGNOSIS — A31.0 MAI (MYCOBACTERIUM AVIUM-INTRACELLULARE) INFECTION (HCC): ICD-10-CM

## 2024-02-29 LAB
ALBUMIN SERPL-MCNC: 4.1 G/DL (ref 3.4–5)
ALBUMIN/GLOB SERPL: 1.6 {RATIO} (ref 1.1–2.2)
ALP SERPL-CCNC: 128 U/L (ref 40–129)
ALT SERPL-CCNC: 16 U/L (ref 10–40)
ANION GAP SERPL CALCULATED.3IONS-SCNC: 8 MMOL/L (ref 3–16)
AST SERPL-CCNC: 28 U/L (ref 15–37)
BASOPHILS # BLD: 0.1 K/UL (ref 0–0.2)
BASOPHILS NFR BLD: 0.5 %
BILIRUB SERPL-MCNC: 0.3 MG/DL (ref 0–1)
BUN SERPL-MCNC: 21 MG/DL (ref 7–20)
CALCIUM SERPL-MCNC: 10.4 MG/DL (ref 8.3–10.6)
CHLORIDE SERPL-SCNC: 106 MMOL/L (ref 99–110)
CO2 SERPL-SCNC: 27 MMOL/L (ref 21–32)
CREAT SERPL-MCNC: 1.1 MG/DL (ref 0.6–1.2)
DEPRECATED RDW RBC AUTO: 17.3 % (ref 12.4–15.4)
EOSINOPHIL # BLD: 0 K/UL (ref 0–0.6)
EOSINOPHIL NFR BLD: 0.2 %
GFR SERPLBLD CREATININE-BSD FMLA CKD-EPI: 49 ML/MIN/{1.73_M2}
GLUCOSE SERPL-MCNC: 117 MG/DL (ref 70–99)
HCT VFR BLD AUTO: 36.7 % (ref 36–48)
HGB BLD-MCNC: 11.9 G/DL (ref 12–16)
LYMPHOCYTES # BLD: 0.8 K/UL (ref 1–5.1)
LYMPHOCYTES NFR BLD: 7.5 %
MCH RBC QN AUTO: 27.5 PG (ref 26–34)
MCHC RBC AUTO-ENTMCNC: 32.3 G/DL (ref 31–36)
MCV RBC AUTO: 85.2 FL (ref 80–100)
MONOCYTES # BLD: 0.6 K/UL (ref 0–1.3)
MONOCYTES NFR BLD: 5.5 %
NEUTROPHILS # BLD: 9.3 K/UL (ref 1.7–7.7)
NEUTROPHILS NFR BLD: 86.3 %
PLATELET # BLD AUTO: 344 K/UL (ref 135–450)
PMV BLD AUTO: 10.2 FL (ref 5–10.5)
POTASSIUM SERPL-SCNC: 4.1 MMOL/L (ref 3.5–5.1)
PROT SERPL-MCNC: 6.6 G/DL (ref 6.4–8.2)
RBC # BLD AUTO: 4.31 M/UL (ref 4–5.2)
REASON FOR REJECTION: NORMAL
REJECTED TEST: NORMAL
SODIUM SERPL-SCNC: 141 MMOL/L (ref 136–145)
WBC # BLD AUTO: 10.7 K/UL (ref 4–11)

## 2024-03-01 ENCOUNTER — TELEPHONE (OUTPATIENT)
Dept: INFECTIOUS DISEASES | Age: 84
End: 2024-03-01

## 2024-03-01 LAB
H CAPSUL AG UR IA-ACNC: NOT DETECTED NG/ML
H CAPSUL AG UR QL IA: NOT DETECTED

## 2024-03-01 NOTE — TELEPHONE ENCOUNTER
Received VM from Liliane at [facility] lab stating voriconazole level was rejected and they will call patient to have redrawn

## 2024-03-03 LAB — MISCELLANEOUS LAB TEST ORDER: ABNORMAL

## 2024-03-04 DIAGNOSIS — B44.1 PULMONARY ASPERGILLOSIS (HCC): ICD-10-CM

## 2024-03-04 DIAGNOSIS — Z71.89 ENCOUNTER FOR MEDICATION COUNSELING: ICD-10-CM

## 2024-03-05 ENCOUNTER — OFFICE VISIT (OUTPATIENT)
Dept: INFECTIOUS DISEASES | Age: 84
End: 2024-03-05
Payer: MEDICARE

## 2024-03-05 VITALS
OXYGEN SATURATION: 92 % | WEIGHT: 89.6 LBS | DIASTOLIC BLOOD PRESSURE: 75 MMHG | BODY MASS INDEX: 19.33 KG/M2 | HEIGHT: 57 IN | HEART RATE: 81 BPM | TEMPERATURE: 98.1 F | SYSTOLIC BLOOD PRESSURE: 147 MMHG

## 2024-03-05 DIAGNOSIS — Z90.49 S/P COLECTOMY: ICD-10-CM

## 2024-03-05 DIAGNOSIS — A31.0 MAI (MYCOBACTERIUM AVIUM-INTRACELLULARE) INFECTION (HCC): ICD-10-CM

## 2024-03-05 DIAGNOSIS — J47.0 BRONCHIECTASIS WITH ACUTE LOWER RESPIRATORY INFECTION (HCC): ICD-10-CM

## 2024-03-05 DIAGNOSIS — K57.30 DIVERTICULOSIS OF COLON: ICD-10-CM

## 2024-03-05 DIAGNOSIS — B44.1 PULMONARY ASPERGILLOSIS (HCC): Primary | ICD-10-CM

## 2024-03-05 DIAGNOSIS — Z77.22 EXPOSURE TO SECOND HAND SMOKE: ICD-10-CM

## 2024-03-05 DIAGNOSIS — Z71.89 ENCOUNTER FOR MEDICATION COUNSELING: ICD-10-CM

## 2024-03-05 DIAGNOSIS — Z86.19 HISTORY OF CLOSTRIDIOIDES DIFFICILE INFECTION: ICD-10-CM

## 2024-03-05 DIAGNOSIS — E78.5 HYPERLIPIDEMIA, UNSPECIFIED HYPERLIPIDEMIA TYPE: ICD-10-CM

## 2024-03-05 DIAGNOSIS — K21.9 GASTROESOPHAGEAL REFLUX DISEASE WITHOUT ESOPHAGITIS: ICD-10-CM

## 2024-03-05 DIAGNOSIS — R05.3 CHRONIC COUGH: ICD-10-CM

## 2024-03-05 DIAGNOSIS — Z98.890 HISTORY OF SINUS SURGERY: ICD-10-CM

## 2024-03-05 PROCEDURE — 99215 OFFICE O/P EST HI 40 MIN: CPT | Performed by: INTERNAL MEDICINE

## 2024-03-05 PROCEDURE — G8420 CALC BMI NORM PARAMETERS: HCPCS | Performed by: INTERNAL MEDICINE

## 2024-03-05 PROCEDURE — 1036F TOBACCO NON-USER: CPT | Performed by: INTERNAL MEDICINE

## 2024-03-05 PROCEDURE — G8427 DOCREV CUR MEDS BY ELIG CLIN: HCPCS | Performed by: INTERNAL MEDICINE

## 2024-03-05 PROCEDURE — G8399 PT W/DXA RESULTS DOCUMENT: HCPCS | Performed by: INTERNAL MEDICINE

## 2024-03-05 PROCEDURE — 1123F ACP DISCUSS/DSCN MKR DOCD: CPT | Performed by: INTERNAL MEDICINE

## 2024-03-05 PROCEDURE — G8484 FLU IMMUNIZE NO ADMIN: HCPCS | Performed by: INTERNAL MEDICINE

## 2024-03-05 PROCEDURE — 1090F PRES/ABSN URINE INCON ASSESS: CPT | Performed by: INTERNAL MEDICINE

## 2024-03-05 RX ORDER — PREDNISONE 20 MG/1
20 TABLET ORAL DAILY
Qty: 5 TABLET | Refills: 0 | Status: SHIPPED | OUTPATIENT
Start: 2024-03-05 | End: 2024-03-10

## 2024-03-05 RX ORDER — VORICONAZOLE 200 MG/1
200 TABLET, FILM COATED ORAL 2 TIMES DAILY
Qty: 60 TABLET | Refills: 3 | Status: SHIPPED | OUTPATIENT
Start: 2024-03-05 | End: 2024-07-03

## 2024-03-05 ASSESSMENT — ENCOUNTER SYMPTOMS
DIARRHEA: 0
SINUS PRESSURE: 0
WHEEZING: 0
SINUS PAIN: 0
SHORTNESS OF BREATH: 0
SORE THROAT: 0
RHINORRHEA: 0
ABDOMINAL PAIN: 0
COUGH: 1
EYE REDNESS: 0
CONSTIPATION: 0
BACK PAIN: 0
NAUSEA: 0
EYE DISCHARGE: 0

## 2024-03-05 NOTE — PROGRESS NOTES
review of data including labs,cultures, imaging, development and implementation of treatment plan and coordination of care).   - Over 50% of face-to-face time spent with pt counseling andeducation.        Please note that this chart was generated using Dragon dictation software. Although every effort was made to ensure the accuracy of this automated transcription, some errors in transcription may have occurred inadvertently. If you may need any clarification, please do not hesitate to contact me through EPIC or at the phone number provided below with my electronic signature.  Any pictures or media included in this note were obtained after taking informed verbal consent from the patient and with their approval to include those in the patient's medical record.    Thankyou for involving me inthe care of your patient.  If you have any additional questions, please do not hesitate to contact me.        Axel Lira MD, MPH, FACP, FIDSA  3/5/24 , 11:24 AM [facility] Infectious Disease   [address] ([employer] [facility]  [place] [address]  Office: [phone]  Fax: [phone]  In-person Clinic days:  Tuesday & Thursday a.m.  Virtual clinic days: Monday, Wednesday & Friday a.m.

## 2024-03-06 LAB — MISCELLANEOUS LAB TEST ORDER: NORMAL

## 2024-03-07 ENCOUNTER — TELEPHONE (OUTPATIENT)
Dept: INFECTIOUS DISEASES | Age: 84
End: 2024-03-07

## 2024-03-07 NOTE — TELEPHONE ENCOUNTER
----- Message from Axel Lira MD sent at 3/7/2024 12:44 PM EST -----  Voriconazole level is in therapeutic range.  This is good.  Please let her know.

## 2024-03-15 ENCOUNTER — HOSPITAL ENCOUNTER (EMERGENCY)
Age: 84
Discharge: HOME | End: 2024-03-15
Payer: MEDICARE

## 2024-03-15 VITALS
RESPIRATION RATE: 20 BRPM | HEART RATE: 90 BPM | BODY MASS INDEX: 19.04 KG/M2 | WEIGHT: 88 LBS | DIASTOLIC BLOOD PRESSURE: 78 MMHG | OXYGEN SATURATION: 98 % | TEMPERATURE: 97.8 F | SYSTOLIC BLOOD PRESSURE: 152 MMHG

## 2024-03-15 DIAGNOSIS — L02.91 ABSCESS: Primary | ICD-10-CM

## 2024-03-15 PROCEDURE — 6370000000 HC RX 637 (ALT 250 FOR IP): Performed by: PHYSICIAN ASSISTANT

## 2024-03-15 PROCEDURE — 99283 EMERGENCY DEPT VISIT LOW MDM: CPT

## 2024-03-15 RX ORDER — DOXYCYCLINE HYCLATE 100 MG
100 TABLET ORAL ONCE
Status: COMPLETED | OUTPATIENT
Start: 2024-03-15 | End: 2024-03-15

## 2024-03-15 RX ORDER — IBUPROFEN 600 MG/1
600 TABLET ORAL 3 TIMES DAILY PRN
Qty: 30 TABLET | Refills: 0 | Status: SHIPPED | OUTPATIENT
Start: 2024-03-15

## 2024-03-15 RX ORDER — DOXYCYCLINE HYCLATE 100 MG
100 TABLET ORAL 2 TIMES DAILY
Qty: 14 TABLET | Refills: 0 | Status: SHIPPED | OUTPATIENT
Start: 2024-03-15 | End: 2024-03-22

## 2024-03-15 RX ADMIN — DOXYCYCLINE HYCLATE 100 MG: 100 TABLET, COATED ORAL at 16:50

## 2024-03-15 ASSESSMENT — PAIN - FUNCTIONAL ASSESSMENT: PAIN_FUNCTIONAL_ASSESSMENT: 0-10

## 2024-03-15 ASSESSMENT — PAIN DESCRIPTION - LOCATION: LOCATION: FACE

## 2024-03-15 ASSESSMENT — PAIN SCALES - GENERAL: PAINLEVEL_OUTOF10: 10

## 2024-03-15 NOTE — ED PROVIDER NOTES
[facility] EMERGENCY DEPARTMENT  EMERGENCY DEPARTMENT ENCOUNTER        Pt Name: Hayley Oviedo  MRN: [id number]  Birthdate 1940  Date of evaluation: 3/15/2024  Provider: Trent Smith PA-C  PCP: Anupam Fagan MD  Note Started: 4:48 PM EDT 3/15/24      ELIAN. I have evaluated this patient.        CHIEF COMPLAINT       Chief Complaint   Patient presents with    Abscess     Abscess to right chin over last few days with drainage. Denies fever       HISTORY OF PRESENT ILLNESS: 1 or more Elements     History From: patient    Hayley Oviedo is a 83 y.o. female who presents complaining of an abscess on her chin for 4 days.  States it started draining on the way here.  Denies any trauma, dental pain, sore throat, difficulty swallowing, current or recent antibiotic use.    Nursing Notes were all reviewed and agreed with or any disagreements were addressed in the HPI.    REVIEW OF SYSTEMS :      Review of Systems   All other systems reviewed and are negative.      Positives and Pertinent negatives as per HPI.       PAST MEDICAL HISTORY    has a past medical history of Clostridium difficile diarrhea (12/07/2014), Diverticulitis, GERD (gastroesophageal reflux disease), GERD (gastroesophageal reflux disease), Hyperlipidemia, Migraine, Pancreatitis, and Thyroid disease.     SURGICAL HISTORY     Past Surgical History:   Procedure Laterality Date    APPENDECTOMY      BRONCHOSCOPY N/A 8/28/2023    BRONCHOSCOPY ALVEOLAR LAVAGE performed by Hamilton Ceballos MD at [facility] ENDOSCOPY    CHOLECYSTECTOMY      COLON SURGERY  8/21/2010    subtotal    COLONOSCOPY  08/20/10    with polypectomy    KNEE SURGERY      ROTATOR CUFF REPAIR      SINUS SURGERY      UPPER GASTROINTESTINAL ENDOSCOPY  08/20/10       CURRENTMEDICATIONS       Previous Medications    ASPIRIN 81 MG CHEWABLE TABLET    Take 1 tablet by mouth daily    ATORVASTATIN (LIPITOR) 20 MG TABLET    Take 1 tablet by mouth daily     BUTALBITAL-ACETAMINOPHEN-CAFFEINE (FIORICET, ESGIC) PER TABLET    Take 1 tablet by mouth every 4 hours as needed    CALCIUM CARBONATE (OSCAL) 500 MG TABS TABLET    Take 1 tablet by mouth 2 times daily    FENOFIBRATE (TRICOR) 145 MG TABLET    Take 134 mg by mouth daily    FLUTICASONE (FLONASE) 50 MCG/ACT NASAL SPRAY    2 sprays in each nostril daily.    LEVOTHYROXINE (SYNTHROID) 50 MCG TABLET    Take 1 tablet by mouth Daily    OMEPRAZOLE (PRILOSEC) 20 MG DELAYED RELEASE CAPSULE    Take 1 capsule by mouth 2 times daily; take on an empty stomach and eat a meal or snack 45 to 60 minutes hour after each dose.    PSEUDOEPHEDRINE-GUAIFENESIN (MUCINEX D PO)    Take 1 tablet by mouth 2 times daily.    ROPINIROLE (REQUIP) 0.5 MG TABLET    Take 1 tablet by mouth nightly as needed    TOPIRAMATE (TOPAMAX SPRINKLE) 25 MG CAPSULE    Take 1 capsule by mouth at bedtime    VORICONAZOLE (VFEND) 200 MG TABLET    Take 1 tablet by mouth 2 times daily       ALLERGIES     Sulfa antibiotics    FAMILYHISTORY       Family History   Problem Relation Age of Onset    Heart Disease Mother     Diabetes Brother         SOCIAL HISTORY       Social History     Tobacco Use    Smoking status: Never     Passive exposure: Past    Smokeless tobacco: Never   Vaping Use    Vaping Use: Never used   Substance Use Topics    Alcohol use: No    Drug use: No       SCREENINGS        Brii Coma Scale  Eye Opening: Spontaneous  Best Verbal Response: Oriented  Best Motor Response: Obeys commands  Lugoff Coma Scale Score: 15                CIWA Assessment  BP: (!) 152/78  Pulse: 90           PHYSICAL EXAM  1 or more Elements     ED Triage Vitals [03/15/24 1632]   BP Temp Temp Source Pulse Respirations SpO2 Height Weight - Scale   (!) 152/78 97.8 °F (36.6 °C) Oral 90 20 98 % -- 39.9 kg (88 lb)       Physical Exam  Vitals and nursing note reviewed.   Constitutional:       General: She is not in acute distress.     Appearance: Normal appearance. She is not

## 2024-03-18 NOTE — ED PROVIDER NOTES
[facility]        Pt Name: Petr Armas  MRN: [id number]  [place] 1940  Date of evaluation: 4/30/2021  Provider: Sallie Higuera PA-C  PCP: Tripp De La Vega MD    ELIAN. I have evaluated this patient. My supervising physician was available for consultation. CHIEF COMPLAINT       Chief Complaint   Patient presents with    Other     pt states accidentally took 's medications instead of hers tonight. took lisinopril, clopidogrel, metoprolol, atorvastatin, clonidine, and finasteride. pt alert and oriented upon arrival. denies any cp or dizziness. HISTORY OF PRESENT ILLNESS   (Location, Timing/Onset, Context/Setting, Quality, Duration, Modifying Factors, Severity, Associated Signs and Symptoms)  Note limiting factors. Petr Armas is a 79 y.o. female patient presents emergency department for evaluation of ingestion of the following medications:  81 mg ASA, mucinex DM, 10mg Bentyl, 5mg finasteride, 40 mg atorvastatin, 75 mg clopidogrel, lisinopril 5mg, 25mg metoprolol, clonidine 0.1mg. Patient states this was an accidental mixup between her medications and her 's. Patient was not trying to harm herself. Patient states she does not have any headache, lightheadedness, dizziness, chest pain or nausea. Patient states she ingested this around 2230 this evening. Nursing Notes were all reviewed and agreed with or any disagreements were addressed in the HPI. REVIEW OF SYSTEMS    (2-9 systems for level 4, 10 or more for level 5)     Review of Systems   Constitutional: Negative for fatigue and fever. HENT: Negative. Eyes: Negative for visual disturbance. Respiratory: Negative for shortness of breath. Cardiovascular: Negative for chest pain. Gastrointestinal: Negative for abdominal pain, nausea and vomiting. Genitourinary: Negative. Musculoskeletal: Negative. Skin: Negative. Neurological: Negative. Positives and Pertinent negatives as per HPI. Except as noted above in the ROS, all other systems were reviewed and negative. PAST MEDICAL HISTORY     Past Medical History:   Diagnosis Date    Clostridium difficile diarrhea 12/7/14    Diverticulitis     GERD (gastroesophageal reflux disease)     GERD (gastroesophageal reflux disease)     Hyperlipidemia     Migraine     Pancreatitis          SURGICAL HISTORY     Past Surgical History:   Procedure Laterality Date    APPENDECTOMY      CHOLECYSTECTOMY      COLON SURGERY  8/21/2010    subtotal    COLONOSCOPY  08/20/10    with polypectomy    KNEE SURGERY      ROTATOR CUFF REPAIR      SINUS SURGERY      UPPER GASTROINTESTINAL ENDOSCOPY  08/20/10         CURRENTMEDICATIONS       Previous Medications    ASPIRIN 81 MG CHEWABLE TABLET    Take 81 mg by mouth daily. ATORVASTATIN (LIPITOR) 20 MG TABLET    Take 20 mg by mouth daily. BUTALBITAL-ACETAMINOPHEN-CAFFEINE (FIORICET, ESGIC) PER TABLET    Take 1 tablet by mouth every 4 hours as needed. CALCIUM CARBONATE (OSCAL) 500 MG TABS TABLET    Take 500 mg by mouth 2 times daily. DICYCLOMINE (BENTYL) 20 MG TABLET    Take 40 mg by mouth three times daily. DIPHENOXYLATE-ATROPINE (LOMOTIL) 2.5-0.025 MG PER TABLET    Take 1 tablet by mouth 4 times daily as needed. ESOMEPRAZOLE (NEXIUM) 40 MG CAPSULE    Take 40 mg by mouth every morning (before breakfast). FENOFIBRATE (TRICOR) 145 MG TABLET    Take 134 mg by mouth daily    FEXOFENADINE HCL (MUCINEX ALLERGY PO)    Take by mouth daily    PSEUDOEPHEDRINE-GUAIFENESIN (MUCINEX D PO)    Take 1 tablet by mouth 2 times daily. SUMATRIPTAN (IMITREX) 25 MG TABLET    Take 25 mg by mouth once as needed. SUMATRIPTAN (IMITREX) 6 MG/0.5ML SOLN INJECTION    Inject 6 mg into the skin once as needed.       TOPIRAMATE (TOPAMAX SPRINKLE) 25 MG CAPSULE    Take 25 mg by mouth daily         ALLERGIES     Sulfa antibiotics    FAMILYHISTORY       Family History   Problem Relation Age of Onset    Heart Disease Mother     Diabetes Brother           SOCIAL HISTORY       Social History     Tobacco Use    Smoking status: Never Smoker    Smokeless tobacco: Never Used   Substance Use Topics    Alcohol use: No    Drug use: No       SCREENINGS             PHYSICAL EXAM    (up to 7 for level 4, 8 or more for level 5)     ED Triage Vitals [05/01/21 0017]   BP Temp Temp Source Pulse Resp SpO2 Height Weight   (!) 156/75 97.6 °F (36.4 °C) Oral 64 16 99 % -- 100 lb (45.4 kg)       Physical Exam  Vitals signs and nursing note reviewed. Constitutional:       General: She is not in acute distress. Appearance: Normal appearance. She is well-developed. She is not ill-appearing, toxic-appearing or diaphoretic. HENT:      Head: Normocephalic and atraumatic. Nose: Nose normal.      Mouth/Throat:      Mouth: Mucous membranes are moist.      Pharynx: Oropharynx is clear. Eyes:      General:         Right eye: No discharge. Left eye: No discharge. Conjunctiva/sclera: Conjunctivae normal.      Pupils: Pupils are equal, round, and reactive to light. Neck:      Musculoskeletal: Normal range of motion and neck supple. Cardiovascular:      Rate and Rhythm: Normal rate and regular rhythm. Heart sounds: Normal heart sounds. No murmur. No gallop. Pulmonary:      Effort: Pulmonary effort is normal. No respiratory distress. Breath sounds: Normal breath sounds. No wheezing, rhonchi or rales. Abdominal:      General: Bowel sounds are normal.      Tenderness: There is no abdominal tenderness. There is no guarding or rebound. Musculoskeletal: Normal range of motion. Skin:     General: Skin is warm and dry. Capillary Refill: Capillary refill takes less than 2 seconds. Coloration: Skin is not jaundiced or pale. Neurological:      Mental Status: She is alert and oriented to person, place, and time. Psychiatric:         Mood and Affect: Mood normal.         Behavior: Behavior normal.         DIAGNOSTIC RESULTS   LABS:    Labs Reviewed   COMPREHENSIVE METABOLIC PANEL W/ REFLEX TO MG FOR LOW K - Abnormal; Notable for the following components:       Result Value    Glucose 112 (*)     BUN 23 (*)     GFR Non- 48 (*)     GFR  62 (*)     All other components within normal limits    Narrative:     Performed at:  [facility]  [address] [facility],  [place], [address]   Phone [phone]   CBC WITH AUTO DIFFERENTIAL    Narrative:     Performed at:  [facility]  [address] [facility],  [place], [address]   Phone [phone]       All other labs were within normal range or not returned as of this dictation. EKG: All EKG's are interpreted by the Emergency Department Physician in the absence of a cardiologist.  Please see their note for interpretation of EKG. RADIOLOGY:   Non-plain film images such as CT, Ultrasound and MRI are read by the radiologist. Plain radiographic images are visualized and preliminarily interpreted by the ED Provider with the below findings:        Interpretation per the Radiologist below, if available at the time of this note:    No orders to display     No results found. PROCEDURES   Unless otherwise noted below, none     Procedures    CRITICAL CARE TIME   N/A    CONSULTS:  None      EMERGENCY DEPARTMENT COURSE and DIFFERENTIAL DIAGNOSIS/MDM:   Vitals:    Vitals:    05/01/21 0300 05/01/21 0315 05/01/21 0330 05/01/21 0345   BP: (!) 99/49 (!) 91/45 110/64 126/62   Pulse: 53 54 62 59   Resp: 14 19 16 18   Temp:       TempSrc:       SpO2: 97% 94% 95% 98%   Weight:           Patient was given the following medications:  Medications - No data to display      Patient presents emergency department for evaluation of accidental ingestion of her 's medications.   Patient took multiple blood pressure medications which are not typically prescribed to her. Patient currently has no symptoms. I consulted poison control and spoke with Addis Santamaria who states that patient should have maximum effect of the blood pressure medications around 5 hours. Patient is observed here in the emergency department until around 4 AM.  Patient's blood pressure does vacillate between 055 and 90 systolic. Per chart review this is largely at the patient's baseline. Upon standing she is not symptomatic. She is able to ambulate in the emergency department without any lightheadedness or dizziness. Patient's heart rate vacillates between 76 and 57 bpm.  Again she has no chest pain and does not feel any lightheadedness or dizziness at time of discharge. I spoke again with Addis Santamaria who states that discharge to home is appropriate at this time. Patient feels well enough to go home. Her son who is also here in the emergency department states that he will check on her as well. At this time I have low concern for acute complication secondary to this ingestion including symptomatic hypotension, significant bradycardia, other acute process that would require further management. FINAL IMPRESSION      1. Accidental drug ingestion, initial encounter          DISPOSITION/PLAN   DISPOSITION Decision To Discharge 05/01/2021 04:02:50 AM      PATIENT REFERREDTO:  No follow-up provider specified.     DISCHARGE MEDICATIONS:  New Prescriptions    No medications on file       DISCONTINUED MEDICATIONS:  Discontinued Medications    No medications on file              (Please note that portions of this note were completed with a voice recognition program.  Efforts were made to edit the dictations but occasionally words are mis-transcribed.)    Srinivasa Fuller PA-C (electronically signed)            Srinivasa Fuller PA-C  05/01/21 7316 No

## 2024-04-18 ENCOUNTER — OFFICE VISIT (OUTPATIENT)
Dept: ENT CLINIC | Age: 84
End: 2024-04-18
Payer: MEDICARE

## 2024-04-18 VITALS
HEIGHT: 57 IN | OXYGEN SATURATION: 96 % | SYSTOLIC BLOOD PRESSURE: 136 MMHG | DIASTOLIC BLOOD PRESSURE: 79 MMHG | TEMPERATURE: 97.8 F | BODY MASS INDEX: 18.16 KG/M2 | HEART RATE: 82 BPM | WEIGHT: 84.2 LBS

## 2024-04-18 DIAGNOSIS — R05.1 ACUTE COUGH: ICD-10-CM

## 2024-04-18 DIAGNOSIS — J01.90 ACUTE RHINOSINUSITIS: Primary | ICD-10-CM

## 2024-04-18 DIAGNOSIS — K21.9 LPRD (LARYNGOPHARYNGEAL REFLUX DISEASE): Chronic | ICD-10-CM

## 2024-04-18 PROCEDURE — 1036F TOBACCO NON-USER: CPT | Performed by: OTOLARYNGOLOGY

## 2024-04-18 PROCEDURE — 1123F ACP DISCUSS/DSCN MKR DOCD: CPT | Performed by: OTOLARYNGOLOGY

## 2024-04-18 PROCEDURE — 99213 OFFICE O/P EST LOW 20 MIN: CPT | Performed by: OTOLARYNGOLOGY

## 2024-04-18 PROCEDURE — G8419 CALC BMI OUT NRM PARAM NOF/U: HCPCS | Performed by: OTOLARYNGOLOGY

## 2024-04-18 PROCEDURE — G8399 PT W/DXA RESULTS DOCUMENT: HCPCS | Performed by: OTOLARYNGOLOGY

## 2024-04-18 PROCEDURE — G8427 DOCREV CUR MEDS BY ELIG CLIN: HCPCS | Performed by: OTOLARYNGOLOGY

## 2024-04-18 PROCEDURE — 1090F PRES/ABSN URINE INCON ASSESS: CPT | Performed by: OTOLARYNGOLOGY

## 2024-04-18 RX ORDER — CEFUROXIME AXETIL 250 MG/1
250 TABLET ORAL 2 TIMES DAILY
Qty: 28 TABLET | Refills: 0 | Status: SHIPPED | OUTPATIENT
Start: 2024-04-18 | End: 2024-05-02

## 2024-04-18 RX ORDER — HYDROCODONE POLISTIREX AND CHLORPHENIRAMINE POLISTIREX 10; 8 MG/5ML; MG/5ML
5 SUSPENSION, EXTENDED RELEASE ORAL EVERY 12 HOURS PRN
Qty: 70 ML | Refills: 0 | Status: SHIPPED | OUTPATIENT
Start: 2024-04-18 | End: 2024-04-25

## 2024-04-18 RX ORDER — OMEPRAZOLE 20 MG/1
CAPSULE, DELAYED RELEASE ORAL
Qty: 180 CAPSULE | Refills: 0 | Status: SHIPPED | OUTPATIENT
Start: 2024-04-18

## 2024-04-18 NOTE — PROGRESS NOTES
disease)  -     omeprazole (PRILOSEC) 20 MG delayed release capsule; Take 1 capsule by mouth 2 times daily; take on an empty stomach and eat a meal or snack 45 to 60 minutes hour after each dose.         RECOMMENDATIONS / PLAN:   Prescription drug management: Prescribed Tussionex for cough and cefuroxime for acute rhinosinusitis.    Continue Omeprazole for LPRD.  See Patient Instructions on file for this visit.  Patient was advised to review and follow my instructions in the AVS, which we reviewed together, and to call and speak to the MA or LPN with any questions regarding these instructions.   Return if sinus symptoms worsen or fail to improve with treatment, or in 3 months for recheck LPR (reflux).           Anupam Rizo MD    [facility]  Department of Otolaryngology/Head and Neck Surgery  [facility]  [address], [address]  [place], [place] [id number] [phone]

## 2024-04-18 NOTE — PATIENT INSTRUCTIONS
and the following:     General side effects of antibiotic:  diarrhea, colitis (severe infection and inflammation of the large intestine), pseudomembranous colitis (severe infection and inflammation of the colon, usually due to C. difficile bacteria)  yeast or fungal  infections, Del Toro-Francisco syndrome (very rare necrotic skin reaction with peeling of skin, blisters and arthritis), persistent symptoms/infection, and failure to improve.       Fluticasone:  nosebleed, burning sensation, atrophy of nasal mucosa, septal ulceration or perforation, nasal irritation, nasal yeast infection,  drowsiness, bad taste.        ~~~>>> Also please read the medication information in this AVS and all information given to you by the pharmacist.

## 2024-05-15 ENCOUNTER — HOSPITAL ENCOUNTER (OUTPATIENT)
Age: 84
Discharge: HOME | End: 2024-05-15
Payer: MEDICARE

## 2024-05-15 LAB
C DIFF TOX A+B STL QL IA: NORMAL
LACTOFERRIN STL QL IA: ABNORMAL

## 2024-05-15 PROCEDURE — 83630 LACTOFERRIN FECAL (QUAL): CPT

## 2024-05-15 PROCEDURE — 87506 IADNA-DNA/RNA PROBE TQ 6-11: CPT

## 2024-05-15 PROCEDURE — 87449 NOS EACH ORGANISM AG IA: CPT

## 2024-05-15 PROCEDURE — 87324 CLOSTRIDIUM AG IA: CPT

## 2024-05-16 LAB — GI PATHOGENS PNL STL NAA+PROBE: NORMAL

## 2024-05-24 ENCOUNTER — OFFICE VISIT (OUTPATIENT)
Dept: PULMONOLOGY | Age: 84
End: 2024-05-24
Payer: MEDICARE

## 2024-05-24 VITALS
HEART RATE: 83 BPM | SYSTOLIC BLOOD PRESSURE: 128 MMHG | WEIGHT: 81.2 LBS | BODY MASS INDEX: 17.52 KG/M2 | HEIGHT: 57 IN | OXYGEN SATURATION: 97 % | DIASTOLIC BLOOD PRESSURE: 68 MMHG

## 2024-05-24 DIAGNOSIS — B44.9 ASPERGILLOSIS (HCC): ICD-10-CM

## 2024-05-24 DIAGNOSIS — J47.9 BRONCHIECTASIS WITHOUT COMPLICATION (HCC): Primary | ICD-10-CM

## 2024-05-24 DIAGNOSIS — A31.0 MYCOBACTERIUM AVIUM-INTRACELLULARE INFECTION (HCC): ICD-10-CM

## 2024-05-24 PROCEDURE — G8419 CALC BMI OUT NRM PARAM NOF/U: HCPCS | Performed by: INTERNAL MEDICINE

## 2024-05-24 PROCEDURE — G8427 DOCREV CUR MEDS BY ELIG CLIN: HCPCS | Performed by: INTERNAL MEDICINE

## 2024-05-24 PROCEDURE — 99214 OFFICE O/P EST MOD 30 MIN: CPT | Performed by: INTERNAL MEDICINE

## 2024-05-24 PROCEDURE — 1036F TOBACCO NON-USER: CPT | Performed by: INTERNAL MEDICINE

## 2024-05-24 PROCEDURE — 1090F PRES/ABSN URINE INCON ASSESS: CPT | Performed by: INTERNAL MEDICINE

## 2024-05-24 PROCEDURE — 1123F ACP DISCUSS/DSCN MKR DOCD: CPT | Performed by: INTERNAL MEDICINE

## 2024-05-24 PROCEDURE — G8399 PT W/DXA RESULTS DOCUMENT: HCPCS | Performed by: INTERNAL MEDICINE

## 2024-05-24 ASSESSMENT — ENCOUNTER SYMPTOMS
COLOR CHANGE: 0
CHOKING: 0
COUGH: 1
SINUS PRESSURE: 0
WHEEZING: 0
ABDOMINAL DISTENTION: 0
SHORTNESS OF BREATH: 1
BLOOD IN STOOL: 0
VOICE CHANGE: 0
DIARRHEA: 0
APNEA: 0
RHINORRHEA: 0
BACK PAIN: 1
SORE THROAT: 0
STRIDOR: 0
CONSTIPATION: 0
ABDOMINAL PAIN: 0
VOMITING: 0
CHEST TIGHTNESS: 0

## 2024-05-24 NOTE — PROGRESS NOTES
cervical adenopathy.   Skin:     Coloration: Skin is not pale.      Findings: No erythema, lesion or rash.   Neurological:      Mental Status: She is alert and oriented to person, place, and time. Mental status is at baseline.      Motor: No abnormal muscle tone.         Health Maintenance   Topic Date Due    Depression Screen  Never done    DTaP/Tdap/Td vaccine (1 - Tdap) Never done    Respiratory Syncytial Virus (RSV) Pregnant or age 60 yrs+ (1 - 1-dose 60+ series) Never done    Pneumococcal 65+ years Vaccine (1 of 1 - PCV) 08/10/2004    Lipids  06/11/2022    COVID-19 Vaccine (3 - 2023-24 season) 09/01/2023    Annual Wellness Visit (Medicare Advantage)  Never done    Flu vaccine (Season Ended) 08/01/2024    DEXA (modify frequency per FRAX score)  Completed    Shingles vaccine  Completed    Hepatitis A vaccine  Aged Out    Hepatitis B vaccine  Aged Out    Hib vaccine  Aged Out    Polio vaccine  Aged Out    Meningococcal (ACWY) vaccine  Aged Out        Assessment/Plan:     Diagnosis Orders   1. Bronchiectasis without complication (HCC)  Comprehensive Metabolic Panel    CT CHEST WO CONTRAST      2. Aspergillosis (HCC)  CT CHEST WO CONTRAST      3. Mycobacterium avium-intracellulare infection (HCC)  CT CHEST WO CONTRAST         History of MAC infection and Aspergillus fumigatus noted from bronchoscopy from August 2022.  Currently on Vfend for Aspergillus noted from bronchial wash.  Treatment of MAC infection on hold due to concerns for poor tolerance based on patient's performance status and history of colon resection.  Patient currently has diarrhea, awaiting to see Dr. Sanon from GI.    CT chest without contrast from February showed multifocal nodular opacities with bronchiectatic changes, this is very likely related to MAC infection rather than Aspergillus.  Currently on Vfend, follows with Dr. Lira from ID.  Given patient's poor appetite, diarrhea, high colored urine-will obtain CMP today.    Will repeat CT chest

## 2024-06-04 ENCOUNTER — OFFICE VISIT (OUTPATIENT)
Dept: INFECTIOUS DISEASES | Age: 84
End: 2024-06-04
Payer: MEDICARE

## 2024-06-04 ENCOUNTER — TELEPHONE (OUTPATIENT)
Dept: INFECTIOUS DISEASES | Age: 84
End: 2024-06-04

## 2024-06-04 VITALS
SYSTOLIC BLOOD PRESSURE: 123 MMHG | DIASTOLIC BLOOD PRESSURE: 71 MMHG | OXYGEN SATURATION: 93 % | HEIGHT: 57 IN | TEMPERATURE: 97.9 F | HEART RATE: 86 BPM | WEIGHT: 83 LBS | BODY MASS INDEX: 17.91 KG/M2

## 2024-06-04 DIAGNOSIS — K52.1 ANTIBIOTIC-ASSOCIATED DIARRHEA: Primary | ICD-10-CM

## 2024-06-04 DIAGNOSIS — Z90.49 S/P COLECTOMY: ICD-10-CM

## 2024-06-04 DIAGNOSIS — B44.1 PULMONARY ASPERGILLOSIS (HCC): ICD-10-CM

## 2024-06-04 DIAGNOSIS — T36.95XA ANTIBIOTIC-ASSOCIATED DIARRHEA: Primary | ICD-10-CM

## 2024-06-04 DIAGNOSIS — K57.30 DIVERTICULOSIS OF COLON: ICD-10-CM

## 2024-06-04 DIAGNOSIS — Z98.890 HISTORY OF SINUS SURGERY: ICD-10-CM

## 2024-06-04 DIAGNOSIS — J47.0 BRONCHIECTASIS WITH ACUTE LOWER RESPIRATORY INFECTION (HCC): ICD-10-CM

## 2024-06-04 DIAGNOSIS — Z86.19 HISTORY OF CLOSTRIDIOIDES DIFFICILE INFECTION: ICD-10-CM

## 2024-06-04 DIAGNOSIS — A31.0 MAI (MYCOBACTERIUM AVIUM-INTRACELLULARE) INFECTION (HCC): ICD-10-CM

## 2024-06-04 DIAGNOSIS — Z71.89 ENCOUNTER FOR MEDICATION COUNSELING: ICD-10-CM

## 2024-06-04 DIAGNOSIS — Z51.81 THERAPEUTIC DRUG MONITORING: ICD-10-CM

## 2024-06-04 DIAGNOSIS — Z77.22 EXPOSURE TO SECOND HAND SMOKE: ICD-10-CM

## 2024-06-04 DIAGNOSIS — K21.9 GASTROESOPHAGEAL REFLUX DISEASE WITHOUT ESOPHAGITIS: ICD-10-CM

## 2024-06-04 PROCEDURE — G8419 CALC BMI OUT NRM PARAM NOF/U: HCPCS | Performed by: INTERNAL MEDICINE

## 2024-06-04 PROCEDURE — 1123F ACP DISCUSS/DSCN MKR DOCD: CPT | Performed by: INTERNAL MEDICINE

## 2024-06-04 PROCEDURE — G8399 PT W/DXA RESULTS DOCUMENT: HCPCS | Performed by: INTERNAL MEDICINE

## 2024-06-04 PROCEDURE — 1036F TOBACCO NON-USER: CPT | Performed by: INTERNAL MEDICINE

## 2024-06-04 PROCEDURE — 99215 OFFICE O/P EST HI 40 MIN: CPT | Performed by: INTERNAL MEDICINE

## 2024-06-04 PROCEDURE — G8427 DOCREV CUR MEDS BY ELIG CLIN: HCPCS | Performed by: INTERNAL MEDICINE

## 2024-06-04 PROCEDURE — 1090F PRES/ABSN URINE INCON ASSESS: CPT | Performed by: INTERNAL MEDICINE

## 2024-06-04 RX ORDER — VANCOMYCIN HYDROCHLORIDE 250 MG/1
250 CAPSULE ORAL 4 TIMES DAILY
Qty: 40 CAPSULE | Refills: 0 | Status: SHIPPED | OUTPATIENT
Start: 2024-06-04 | End: 2024-06-14

## 2024-06-04 ASSESSMENT — ENCOUNTER SYMPTOMS
SINUS PRESSURE: 0
RHINORRHEA: 0
NAUSEA: 0
DIARRHEA: 1
SHORTNESS OF BREATH: 0
SORE THROAT: 0
BACK PAIN: 0
EYE REDNESS: 0
CONSTIPATION: 0
ABDOMINAL PAIN: 0
COUGH: 0
EYE DISCHARGE: 0
WHEEZING: 0
SINUS PAIN: 0

## 2024-06-04 NOTE — PROGRESS NOTES
and then we will decide if we need to give her more refills on the oral voriconazole  The patient had taken a 2-week course of oral Ceftin for sinusitis in April.  She started having significant watery diarrhea to the end of that Ceftin course.  The diarrhea is still ongoing and the patient reports losing more than 50 pounds of weight unintentionally  She had a stool C. difficile antigen test done on 5/15/2024.  It was negative.  Stool GI PCR test was negative  The patient had 3 watery stools yesterday  As there is high pretest probability for C. difficile and this patient as the diarrhea started toward the end of her 2-week course of oral Ceftin and as the negative predictive value stool C. difficile antigen test is not high, I will treat her empirically with a 10-day course of oral vancomycin 250 mg every 6 hours.  Will see how her diarrhea responds to that  Discussed the importance of compliance with antifungal treatment  Discussed with patient the strategies to stay safe from COVID 19 exposures including safe social distancing, frequent and proper hand hygiene when outside and using 3 layered mouth and nose coverings/facemasks when outside their home.      Labs ordered today in EPIC:    Orders Placed This Encounter   Procedures    CT CHEST WO CONTRAST     Standing Status:   Future     Standing Expiration Date:   6/4/2025     Order Specific Question:   Reason for exam:     Answer:   f/u on lung lesions, h/o pulm aspergillosis    CBC with Auto Differential     Standing Status:   Future     Standing Expiration Date:   6/4/2025    Comprehensive Metabolic Panel     Standing Status:   Future     Standing Expiration Date:   6/4/2025    MISCELLANEOUS SENDOUT Voriconazole level     Standing Status:   Future     Standing Expiration Date:   6/4/2025     Order Specific Question:   Specify Req. Test (1 Test/Order)     Answer:   Voriconazole level       Medications ordered today in EPIC:    Orders Placed This Encounter

## 2024-06-04 NOTE — TELEPHONE ENCOUNTER
[facility] Author[facility].  PA needed for Vancomycin 250mg Capsules    Key: [id number]

## 2024-06-05 NOTE — TELEPHONE ENCOUNTER
Approved today  PA Case: [id number], Status: Approved, Coverage Starts on: 1/1/2024 12:00:00 AM, Coverage Ends on: 12/31/2024     If this requires a response please respond to the pool ( P [facility] P[facility] MEDICATION PRE-AUTH).      Thank you please advise patient.

## 2024-06-05 NOTE — TELEPHONE ENCOUNTER
Submitted PA for Vancomycin  Via [facility] Key: [id number]  STATUS: PENDING.    Follow up done daily; if no decision with in three days we will refax.  If another three days goes by with no decision will call the insurance for status.

## 2024-06-10 DIAGNOSIS — B44.1 PULMONARY ASPERGILLOSIS (HCC): ICD-10-CM

## 2024-06-10 LAB
BASOPHILS # BLD: 0.1 K/UL (ref 0–0.2)
BASOPHILS NFR BLD: 0.9 %
DEPRECATED RDW RBC AUTO: 17.6 % (ref 12.4–15.4)
EOSINOPHIL # BLD: 0.1 K/UL (ref 0–0.6)
EOSINOPHIL NFR BLD: 0.7 %
HCT VFR BLD AUTO: 37 % (ref 36–48)
HGB BLD-MCNC: 11.5 G/DL (ref 12–16)
LYMPHOCYTES # BLD: 1 K/UL (ref 1–5.1)
LYMPHOCYTES NFR BLD: 10.1 %
MCH RBC QN AUTO: 26.2 PG (ref 26–34)
MCHC RBC AUTO-ENTMCNC: 31 G/DL (ref 31–36)
MCV RBC AUTO: 84.5 FL (ref 80–100)
MONOCYTES # BLD: 0.6 K/UL (ref 0–1.3)
MONOCYTES NFR BLD: 5.7 %
NEUTROPHILS # BLD: 8 K/UL (ref 1.7–7.7)
NEUTROPHILS NFR BLD: 82.6 %
PLATELET # BLD AUTO: 442 K/UL (ref 135–450)
PMV BLD AUTO: 10.1 FL (ref 5–10.5)
RBC # BLD AUTO: 4.38 M/UL (ref 4–5.2)
WBC # BLD AUTO: 9.7 K/UL (ref 4–11)

## 2024-06-11 LAB
ALBUMIN SERPL-MCNC: 3.9 G/DL (ref 3.4–5)
ALBUMIN/GLOB SERPL: 1.4 {RATIO} (ref 1.1–2.2)
ALP SERPL-CCNC: 169 U/L (ref 40–129)
ALT SERPL-CCNC: 12 U/L (ref 10–40)
ANION GAP SERPL CALCULATED.3IONS-SCNC: 8 MMOL/L (ref 3–16)
AST SERPL-CCNC: 29 U/L (ref 15–37)
BILIRUB SERPL-MCNC: <0.2 MG/DL (ref 0–1)
BUN SERPL-MCNC: 20 MG/DL (ref 7–20)
CALCIUM SERPL-MCNC: 9.8 MG/DL (ref 8.3–10.6)
CHLORIDE SERPL-SCNC: 111 MMOL/L (ref 99–110)
CO2 SERPL-SCNC: 27 MMOL/L (ref 21–32)
CREAT SERPL-MCNC: 0.7 MG/DL (ref 0.6–1.2)
GFR SERPLBLD CREATININE-BSD FMLA CKD-EPI: 85 ML/MIN/{1.73_M2}
GLUCOSE SERPL-MCNC: 102 MG/DL (ref 70–99)
POTASSIUM SERPL-SCNC: 4.1 MMOL/L (ref 3.5–5.1)
PROT SERPL-MCNC: 6.6 G/DL (ref 6.4–8.2)
SODIUM SERPL-SCNC: 146 MMOL/L (ref 136–145)

## 2024-06-13 LAB — MISCELLANEOUS LAB TEST ORDER: NORMAL

## 2024-06-14 ENCOUNTER — TELEPHONE (OUTPATIENT)
Dept: INFECTIOUS DISEASES | Age: 84
End: 2024-06-14

## 2024-06-14 NOTE — TELEPHONE ENCOUNTER
Called patient's number and spoke with son Winston. I made Winston aware of results and no change in treatment. HE verbalized understanding.         ----- Message from Axel Lira MD sent at 6/13/2024  9:12 PM EDT -----  Current voriconazole level is in the desired range.  Please let her know.  No changes in the treatment plan.

## 2024-07-03 ENCOUNTER — TELEPHONE (OUTPATIENT)
Dept: INFECTIOUS DISEASES | Age: 84
End: 2024-07-03

## 2024-07-03 ENCOUNTER — HOSPITAL ENCOUNTER (OUTPATIENT)
Dept: CT IMAGING | Age: 84
Discharge: HOME | End: 2024-07-03
Attending: INTERNAL MEDICINE
Payer: MEDICARE

## 2024-07-03 DIAGNOSIS — B44.1 PULMONARY ASPERGILLOSIS (HCC): ICD-10-CM

## 2024-07-03 PROCEDURE — 71250 CT THORAX DX C-: CPT

## 2024-07-03 NOTE — TELEPHONE ENCOUNTER
----- Message from Axel Lira MD sent at 7/3/2024  4:01 PM EDT -----  CT scan is mostly stable.  No changes in the plan.  Please advise the patient to keep on watching for any worsening shortness of breath.  Follow-up with me next month in office

## 2024-07-18 ENCOUNTER — OFFICE VISIT (OUTPATIENT)
Dept: ENT CLINIC | Age: 84
End: 2024-07-18

## 2024-07-18 VITALS
OXYGEN SATURATION: 97 % | DIASTOLIC BLOOD PRESSURE: 74 MMHG | TEMPERATURE: 97.9 F | BODY MASS INDEX: 17.04 KG/M2 | WEIGHT: 79 LBS | SYSTOLIC BLOOD PRESSURE: 129 MMHG | HEIGHT: 57 IN | HEART RATE: 83 BPM

## 2024-07-18 DIAGNOSIS — K21.9 LPRD (LARYNGOPHARYNGEAL REFLUX DISEASE): Primary | ICD-10-CM

## 2024-07-18 NOTE — PROGRESS NOTES
CHIEF COMPLAINT  Chief Complaint   Patient presents with    Sinusitis    Cough    Laryngopharyngeal reflux disease         PROCEDURE:  FLEXIBLE FIBEROPTIC NASOPHARYNGOLARYNGOSCOPY    INTERVAL HISTORY:  Cough at night is better.  Swallowing is better.  Still have a cough during the day.   No pain in face.  Swallowing has gotten a lot better.        INDICATION:  Inadequate visualization by indirect laryngoscopy mirror examination and need for detailed endoscopic examination of the larynx and pharynx to evaluate the upper aerodigestive tract for    recheck LPRD and chronic reflux laryngitis.         INFORMED CONSENT:  The procedure was described to the patient, including method of anesthesia.  The patient was advised of the medical necessity for this procedure.  The risks and potential complications were discussed, including, but not limited to, bleeding, infection, adverse reaction to medications, hoarseness, sore throat, and inability to obtain adequate visualization.  The expected outcome, potential benefits and the alternatives of therapy were discussed.  Hayley asked appropriate questions and then expressed the lack of any further questions, understanding, acceptance, and the desire to undergo with this procedure, granting verbal informed consent.        FINDINGS:  There was mild edema and erythema of the arytenoid and interarytenoid mucosa consistent with posterior laryngitis secondary to laryngopharyngeal reflux.  The vocal cords appeared to be normal, with no nodule, ulceration, polyp, leukoplakia or other lesions, and appeared to be normally mobile bilaterally with midline approximation on phonation.  Sensation of the hypopharynx and larynx appeared to be normal when touched by the end of the flexible scope. The nasopharynx, eustachian tube orifices and fossa of Rosenmüller, oropharynx, base of tongue, hypopharynx, supraglottis, subglottis, and piriform sinuses all appeared to be normal, with no lesions.

## 2024-08-06 ENCOUNTER — OFFICE VISIT (OUTPATIENT)
Dept: INFECTIOUS DISEASES | Age: 84
End: 2024-08-06
Payer: MEDICARE

## 2024-08-06 VITALS
SYSTOLIC BLOOD PRESSURE: 130 MMHG | WEIGHT: 83.2 LBS | TEMPERATURE: 98.1 F | OXYGEN SATURATION: 96 % | HEART RATE: 84 BPM | HEIGHT: 57 IN | BODY MASS INDEX: 17.95 KG/M2 | DIASTOLIC BLOOD PRESSURE: 71 MMHG

## 2024-08-06 DIAGNOSIS — Z86.19 HISTORY OF CLOSTRIDIOIDES DIFFICILE INFECTION: ICD-10-CM

## 2024-08-06 DIAGNOSIS — Z98.890 HISTORY OF SINUS SURGERY: ICD-10-CM

## 2024-08-06 DIAGNOSIS — E78.5 HYPERLIPIDEMIA, UNSPECIFIED HYPERLIPIDEMIA TYPE: ICD-10-CM

## 2024-08-06 DIAGNOSIS — A31.0 MAI (MYCOBACTERIUM AVIUM-INTRACELLULARE) (HCC): Primary | ICD-10-CM

## 2024-08-06 DIAGNOSIS — Z51.81 THERAPEUTIC DRUG MONITORING: ICD-10-CM

## 2024-08-06 DIAGNOSIS — J47.0 BRONCHIECTASIS WITH ACUTE LOWER RESPIRATORY INFECTION (HCC): ICD-10-CM

## 2024-08-06 DIAGNOSIS — Z71.89 ENCOUNTER FOR MEDICATION COUNSELING: ICD-10-CM

## 2024-08-06 DIAGNOSIS — K21.9 GASTROESOPHAGEAL REFLUX DISEASE WITHOUT ESOPHAGITIS: ICD-10-CM

## 2024-08-06 DIAGNOSIS — A31.0 MAI (MYCOBACTERIUM AVIUM-INTRACELLULARE) INFECTION (HCC): ICD-10-CM

## 2024-08-06 DIAGNOSIS — R05.3 CHRONIC COUGH: ICD-10-CM

## 2024-08-06 DIAGNOSIS — B44.1 PULMONARY ASPERGILLOSIS (HCC): ICD-10-CM

## 2024-08-06 DIAGNOSIS — Z77.22 EXPOSURE TO SECOND HAND SMOKE: ICD-10-CM

## 2024-08-06 DIAGNOSIS — Z90.49 S/P COLECTOMY: ICD-10-CM

## 2024-08-06 PROCEDURE — G8399 PT W/DXA RESULTS DOCUMENT: HCPCS | Performed by: INTERNAL MEDICINE

## 2024-08-06 PROCEDURE — G8419 CALC BMI OUT NRM PARAM NOF/U: HCPCS | Performed by: INTERNAL MEDICINE

## 2024-08-06 PROCEDURE — 1090F PRES/ABSN URINE INCON ASSESS: CPT | Performed by: INTERNAL MEDICINE

## 2024-08-06 PROCEDURE — 1036F TOBACCO NON-USER: CPT | Performed by: INTERNAL MEDICINE

## 2024-08-06 PROCEDURE — 1123F ACP DISCUSS/DSCN MKR DOCD: CPT | Performed by: INTERNAL MEDICINE

## 2024-08-06 PROCEDURE — 99215 OFFICE O/P EST HI 40 MIN: CPT | Performed by: INTERNAL MEDICINE

## 2024-08-06 PROCEDURE — G8427 DOCREV CUR MEDS BY ELIG CLIN: HCPCS | Performed by: INTERNAL MEDICINE

## 2024-08-06 RX ORDER — CLARITHROMYCIN 500 MG/1
500 TABLET, COATED ORAL EVERY OTHER DAY
Qty: 60 TABLET | Refills: 0 | Status: SHIPPED | OUTPATIENT
Start: 2024-08-06 | End: 2024-12-04

## 2024-08-06 RX ORDER — RIFABUTIN 150 MG/1
300 CAPSULE ORAL EVERY OTHER DAY
Qty: 120 CAPSULE | Refills: 0 | Status: SHIPPED | OUTPATIENT
Start: 2024-08-06 | End: 2024-08-08

## 2024-08-06 RX ORDER — LACTOBACILLUS RHAMNOSUS GG 10B CELL
1 CAPSULE ORAL 2 TIMES DAILY
Qty: 60 CAPSULE | Refills: 3 | Status: SHIPPED | OUTPATIENT
Start: 2024-08-06 | End: 2024-12-04

## 2024-08-06 RX ORDER — VORICONAZOLE 200 MG/1
200 TABLET, FILM COATED ORAL 2 TIMES DAILY
Qty: 60 TABLET | Refills: 3 | Status: SHIPPED | OUTPATIENT
Start: 2024-08-06 | End: 2024-12-04

## 2024-08-06 RX ORDER — ETHAMBUTOL HYDROCHLORIDE 400 MG/1
800 TABLET, FILM COATED ORAL EVERY OTHER DAY
Qty: 120 TABLET | Refills: 0 | Status: SHIPPED | OUTPATIENT
Start: 2024-08-06 | End: 2024-12-04

## 2024-08-06 ASSESSMENT — ENCOUNTER SYMPTOMS
EYE REDNESS: 0
NAUSEA: 0
EYE DISCHARGE: 0
DIARRHEA: 0
COUGH: 1
WHEEZING: 0
ABDOMINAL PAIN: 0
RHINORRHEA: 0
SORE THROAT: 0
SINUS PRESSURE: 0
CONSTIPATION: 0
SINUS PAIN: 0
SHORTNESS OF BREATH: 0
BACK PAIN: 0

## 2024-08-06 NOTE — PROGRESS NOTES
Infectious Diseases Oupatient Follow-up Note            Reason for Visit:               Follow-up for pulmonary aspergillosis  Primary Care Physician:  Anupam Fagan MD  History Obtained From:   Patient , Medical Records     CHIEF COMPLAINT:    Chief Complaint   Patient presents with    Follow-up     Lung Disease -nk       INTERVAL HISTORY: Hayley Oviedo is a 83 y.o. pleasant female patient, who had an outpatient visit with me today for follow-up.    History was obtained from chart review and the patient. The patient had a in-person clinic visit with me today for above mentioned complaints.    The patient had a CT scan of the chest done in August 2023 that had shown a right upper lobe lung nodule and the patient underwent a bronchoscopy.  On August 28, 2023.  The BAL culture grew Enterobacter, Aspergillus fumigatus and Mycobacterium avium.  She was referred to me.  I started the patient on oral voriconazole 200 mg every 12 hours towards the end of September 2023.    The patient made good progress.  She was last seen by me in June 2023.  I decided to continue voriconazole and had ordered a follow-up CT scan of the chest and labs and voriconazole level for the patient.    The patient comes in today for a follow-up.        Past Medical History:   Past medical and surgical history was reviewed by me during this visit in detail.    Past Medical History:   Diagnosis Date    Clostridium difficile diarrhea 12/07/2014    Diverticulitis     GERD (gastroesophageal reflux disease)     GERD (gastroesophageal reflux disease)     Hyperlipidemia     Migraine     Pancreatitis     Thyroid disease        Past Surgical History:    Past Surgical History:   Procedure Laterality Date    APPENDECTOMY      BRONCHOSCOPY N/A 8/28/2023    BRONCHOSCOPY ALVEOLAR LAVAGE performed by Hamilton Ceballos MD at [facility]    CHOLECYSTECTOMY      COLON SURGERY  8/21/2010    subtotal    COLONOSCOPY  08/20/10    with

## 2024-08-08 ENCOUNTER — TELEPHONE (OUTPATIENT)
Dept: INFECTIOUS DISEASES | Age: 84
End: 2024-08-08

## 2024-08-08 RX ORDER — RIFAMPIN 150 MG/1
450 CAPSULE ORAL EVERY OTHER DAY
Qty: 135 CAPSULE | Refills: 0
Start: 2024-08-08 | End: 2024-11-06

## 2024-08-08 NOTE — TELEPHONE ENCOUNTER
Spoke with pharmacist Alison cadet [facility] who ran cost of rifampin 450 mg every other day x 90 days ( 300 mg + 150 mg tablet) cost is $157.48    Spoke with patient who states she can afford this and will notify pharmacy to order    Med list updated

## 2024-08-08 NOTE — TELEPHONE ENCOUNTER
Wonderful.  Please let her know that we will need to monitor her voriconazole level closely as I had discussed with her.  She should keep on getting it done every 4 weeks.  Standing orders are already in the chart.  Thank you!

## 2024-08-08 NOTE — TELEPHONE ENCOUNTER
The medications are already written every other day.  Clarithromycin alone will not be enough for ALO.  I can switch rifabutin to oral rifampin 450 mg every other day. She can check the costs of that.

## 2024-08-08 NOTE — TELEPHONE ENCOUNTER
Received message from patient that she cannot afford the five rx that Dr. Lira prescribed at her visit on 8-6-24.  Spoke with pharmacist at [facility] who states no PA is needed patient is in the \"donut hole\" so no assistance is available on cost.    Spoke with pharmacist Kwasi at [facility] out patient pharmacy to see if they can get rx at a better cost. He states they can only get cost down around $20 cheaper.    Spoke with patient who states she can afford voriconazole, clarithromycin and lactobacillus. Unfortunately rifabutin and ethambutol are over $300. She would like to know if there are other drugs she could be prescribed in their place that we could run through her insurance to see if they would be cheaper?

## 2024-08-23 ENCOUNTER — TELEPHONE (OUTPATIENT)
Dept: INFECTIOUS DISEASES | Age: 84
End: 2024-08-23

## 2024-08-23 ENCOUNTER — OFFICE VISIT (OUTPATIENT)
Dept: PULMONOLOGY | Age: 84
End: 2024-08-23
Payer: MEDICARE

## 2024-08-23 VITALS
HEIGHT: 57 IN | SYSTOLIC BLOOD PRESSURE: 118 MMHG | OXYGEN SATURATION: 99 % | WEIGHT: 85 LBS | BODY MASS INDEX: 18.34 KG/M2 | HEART RATE: 58 BPM | DIASTOLIC BLOOD PRESSURE: 70 MMHG

## 2024-08-23 DIAGNOSIS — A31.0 PULMONARY MYCOBACTERIUM AVIUM COMPLEX (MAC) INFECTION (HCC): ICD-10-CM

## 2024-08-23 DIAGNOSIS — J47.9 BRONCHIECTASIS WITHOUT COMPLICATION (HCC): ICD-10-CM

## 2024-08-23 DIAGNOSIS — R60.0 BILATERAL LEG EDEMA: Primary | ICD-10-CM

## 2024-08-23 DIAGNOSIS — B44.89 ASPERGILLUS FUMIGATUS (HCC): ICD-10-CM

## 2024-08-23 PROCEDURE — G8399 PT W/DXA RESULTS DOCUMENT: HCPCS | Performed by: INTERNAL MEDICINE

## 2024-08-23 PROCEDURE — 1123F ACP DISCUSS/DSCN MKR DOCD: CPT | Performed by: INTERNAL MEDICINE

## 2024-08-23 PROCEDURE — G8419 CALC BMI OUT NRM PARAM NOF/U: HCPCS | Performed by: INTERNAL MEDICINE

## 2024-08-23 PROCEDURE — G8427 DOCREV CUR MEDS BY ELIG CLIN: HCPCS | Performed by: INTERNAL MEDICINE

## 2024-08-23 PROCEDURE — 1090F PRES/ABSN URINE INCON ASSESS: CPT | Performed by: INTERNAL MEDICINE

## 2024-08-23 PROCEDURE — 99215 OFFICE O/P EST HI 40 MIN: CPT | Performed by: INTERNAL MEDICINE

## 2024-08-23 PROCEDURE — 1036F TOBACCO NON-USER: CPT | Performed by: INTERNAL MEDICINE

## 2024-08-23 ASSESSMENT — ENCOUNTER SYMPTOMS
VOMITING: 0
BLOOD IN STOOL: 0
CHOKING: 0
RHINORRHEA: 0
BACK PAIN: 0
COUGH: 1
STRIDOR: 0
ABDOMINAL DISTENTION: 0
WHEEZING: 0
SHORTNESS OF BREATH: 0
CHEST TIGHTNESS: 0
COLOR CHANGE: 0
CONSTIPATION: 0
APNEA: 0
DIARRHEA: 0
SINUS PRESSURE: 0
ABDOMINAL PAIN: 0
VOICE CHANGE: 0
SORE THROAT: 0

## 2024-08-23 NOTE — PROGRESS NOTES
for GI side effects.  Also the cost of combination treatment for both Aspergillus and MAC infection is prohibitory.  Hence I have discussed with Dr. Lira, we decided to hold off on Vfend and treat her with triple antibiotic therapy for MAC instead.  Close monitoring of lab work including LFTs.  Patient is aware of this change which was discussed in office today.    Unfortunately, patient is very frail and it is unclear if patient will be able to tolerate current therapy.    For cough associated with bronchiectasis, I have suggested to buy an Acapella valve/flutter valve to improve respiratory toilet.  I also suggested using albuterol nebulizer machine, but patient wants to hold off at this time.    Bilateral lower extremity edema, Lt>Rt, with poor mobility-rule out DVT.  I have ordered lower extremity venous Dopplers today.    Will plan to see patient in 3 months, will likely require repeat CT imaging following this.  Advised patient or son to contact me if they have any further questions.    Return in about 3 months (around 11/23/2024).

## 2024-08-23 NOTE — TELEPHONE ENCOUNTER
Received a call from Dr. Ceballos.  The patient contacted him about the cost of the medications.      The patient has been on voriconazole for about an year.  Okay to come off the voriconazole.      Also, if clarithromycin is costlier for her, oral azithromycin 500 mg every 48 hours will be an alternative to clarithromycin for ALO.      I discussed all above with Dr. Ceballos.  Piedad for pulmonology to make above changes.      Axel Lira MD, MPH, FACP, [facility]  8/23/2024, 12:18 PM  Central Office Phone: [phone]  Central Office Fax: [phone]    [facility]   [address], [address] ([address])  [place] [address]  [facility] days:  Tuesday & Thursday AM    [facility]  [address] , [address] (Medical office Building)  [place], [place], [address]  [facility] days: Wednesday AM

## 2024-08-28 ENCOUNTER — HOSPITAL ENCOUNTER (OUTPATIENT)
Dept: VASCULAR LAB | Age: 84
Discharge: HOME | End: 2024-08-30
Attending: INTERNAL MEDICINE
Payer: MEDICARE

## 2024-08-28 DIAGNOSIS — R60.0 BILATERAL LEG EDEMA: ICD-10-CM

## 2024-08-28 PROCEDURE — 93970 EXTREMITY STUDY: CPT

## 2024-08-28 PROCEDURE — 93970 EXTREMITY STUDY: CPT | Performed by: SURGERY

## 2024-11-05 ENCOUNTER — OFFICE VISIT (OUTPATIENT)
Dept: INFECTIOUS DISEASES | Age: 84
End: 2024-11-05
Payer: MEDICARE

## 2024-11-05 VITALS
BODY MASS INDEX: 18.77 KG/M2 | HEIGHT: 58 IN | SYSTOLIC BLOOD PRESSURE: 177 MMHG | WEIGHT: 89.4 LBS | TEMPERATURE: 98.1 F | OXYGEN SATURATION: 94 % | HEART RATE: 77 BPM | DIASTOLIC BLOOD PRESSURE: 84 MMHG

## 2024-11-05 DIAGNOSIS — B44.1 PULMONARY ASPERGILLOSIS (HCC): ICD-10-CM

## 2024-11-05 DIAGNOSIS — J47.0 BRONCHIECTASIS WITH ACUTE LOWER RESPIRATORY INFECTION (HCC): ICD-10-CM

## 2024-11-05 DIAGNOSIS — K21.9 GASTROESOPHAGEAL REFLUX DISEASE WITHOUT ESOPHAGITIS: ICD-10-CM

## 2024-11-05 DIAGNOSIS — R05.3 CHRONIC COUGH: ICD-10-CM

## 2024-11-05 DIAGNOSIS — Z90.49 S/P COLECTOMY: ICD-10-CM

## 2024-11-05 DIAGNOSIS — Z77.22 EXPOSURE TO SECOND HAND SMOKE: ICD-10-CM

## 2024-11-05 DIAGNOSIS — Z86.19 HISTORY OF CLOSTRIDIOIDES DIFFICILE INFECTION: ICD-10-CM

## 2024-11-05 DIAGNOSIS — A31.0 MAI (MYCOBACTERIUM AVIUM-INTRACELLULARE) (HCC): Primary | ICD-10-CM

## 2024-11-05 DIAGNOSIS — Z98.890 HISTORY OF SINUS SURGERY: ICD-10-CM

## 2024-11-05 PROCEDURE — G8399 PT W/DXA RESULTS DOCUMENT: HCPCS | Performed by: INTERNAL MEDICINE

## 2024-11-05 PROCEDURE — G8428 CUR MEDS NOT DOCUMENT: HCPCS | Performed by: INTERNAL MEDICINE

## 2024-11-05 PROCEDURE — G8420 CALC BMI NORM PARAMETERS: HCPCS | Performed by: INTERNAL MEDICINE

## 2024-11-05 PROCEDURE — 1090F PRES/ABSN URINE INCON ASSESS: CPT | Performed by: INTERNAL MEDICINE

## 2024-11-05 PROCEDURE — 99213 OFFICE O/P EST LOW 20 MIN: CPT | Performed by: INTERNAL MEDICINE

## 2024-11-05 PROCEDURE — 1036F TOBACCO NON-USER: CPT | Performed by: INTERNAL MEDICINE

## 2024-11-05 PROCEDURE — 1123F ACP DISCUSS/DSCN MKR DOCD: CPT | Performed by: INTERNAL MEDICINE

## 2024-11-05 PROCEDURE — G8484 FLU IMMUNIZE NO ADMIN: HCPCS | Performed by: INTERNAL MEDICINE

## 2024-11-05 ASSESSMENT — ENCOUNTER SYMPTOMS
WHEEZING: 0
ABDOMINAL PAIN: 0
RHINORRHEA: 0
SINUS PAIN: 0
CONSTIPATION: 0
COUGH: 0
DIARRHEA: 0
EYE DISCHARGE: 0
SORE THROAT: 0
BACK PAIN: 0
SINUS PRESSURE: 0
EYE REDNESS: 0
SHORTNESS OF BREATH: 0
NAUSEA: 0

## 2024-11-05 NOTE — PROGRESS NOTES
avium-intracellulare) (HCC)    2. Pulmonary aspergillosis (HCC)    3. Gastroesophageal reflux disease without esophagitis    4. Bronchiectasis with acute lower respiratory infection (HCC)    5. History of sinus surgery    6. Chronic cough    7. History of Clostridioides difficile infection    8. Exposure to second hand smoke    9. S/P colectomy        Discussion:      The patient comes in today for a follow-up for ALO infection and pulmonary aspergillosis    She has been on voriconazole for pulmonary aspergillosis in September 2023    The patient had bilateral lower extremity venous Doppler study done on 8/28/2024.  It was negative for DVT    The patient unfortunately had insurance and medication affordability issues.  She tells me that she ran out of voriconazole couple of weeks after seeing me last time and could not afford it anymore.    She also could not afford any of her ALO treatment and hence could never started    RECOMMENDATIONS:      Diagnostic Workup:      Continue to follow fever curve  at home      Antimicrobials:    The patient comes in today with her son.  I had a detailed discussion with her today  The patient is having some affordability issues and ran out of her antifungal education about 6 weeks ago and could not afford it  She also could not afford any of her ALO medications  Patient is currently not on any antimicrobial agents  The patient tells me that despite that she is feeling better  She has improved breathing and has improved appetite and is gaining weight.  The patient wants to stay off antimicrobial agents and see how she does without medications  Explained the patient if she starts having increasing breathing troubles or unexplained loss of weight, low-grade fevers or hemoptysis etc., she should contact my office and I can see her for a follow-up earlier  If the patient continues to do well we will plan for a 6-month follow-up  Discussed with patient the strategies to stay safe from

## 2025-01-20 ENCOUNTER — OFFICE VISIT (OUTPATIENT)
Dept: ENT CLINIC | Age: 85
End: 2025-01-20
Payer: MEDICARE

## 2025-01-20 VITALS
OXYGEN SATURATION: 95 % | DIASTOLIC BLOOD PRESSURE: 76 MMHG | BODY MASS INDEX: 18.26 KG/M2 | WEIGHT: 87 LBS | TEMPERATURE: 97.3 F | SYSTOLIC BLOOD PRESSURE: 124 MMHG | HEART RATE: 75 BPM | HEIGHT: 58 IN

## 2025-01-20 DIAGNOSIS — R09.82 POST-NASAL DRAINAGE: Chronic | ICD-10-CM

## 2025-01-20 DIAGNOSIS — K21.9 LPRD (LARYNGOPHARYNGEAL REFLUX DISEASE): Primary | Chronic | ICD-10-CM

## 2025-01-20 PROCEDURE — 31575 DIAGNOSTIC LARYNGOSCOPY: CPT | Performed by: OTOLARYNGOLOGY

## 2025-01-20 RX ORDER — FLUTICASONE PROPIONATE 50 MCG
SPRAY, SUSPENSION (ML) NASAL
Qty: 48 G | Refills: 0 | Status: SHIPPED | OUTPATIENT
Start: 2025-01-20

## 2025-01-20 NOTE — PROGRESS NOTES
CHIEF COMPLAINT  Chief Complaint   Patient presents with    Laryngopharyngeal reflux disease         PROCEDURE:  FLEXIBLE FIBEROPTIC NASOPHARYNGOLARYNGOSCOPY    INTERVAL HISTORY:  Patient reported that the cough is gone and all other reflux throat symptoms gone.        INDICATION:  Need for detailed endoscopic examination of the larynx and pharynx to evaluate the upper aerodigestive tract for  recheck LPRD and chronic reflux laryngitis.         INFORMED CONSENT:  The procedure was described to the patient, including method of anesthesia.  The patient was advised of the medical necessity for this procedure.  The risks and potential complications were discussed, including, but not limited to, bleeding, infection, adverse reaction to medications, hoarseness, sore throat, and inability to obtain adequate visualization.  The expected outcome, potential benefits and the alternatives of therapy were discussed.  Hayley asked appropriate questions and then expressed the lack of any further questions, understanding, acceptance, and the desire to undergo with this procedure, granting verbal informed consent.        FINDINGS:  There was minimal edema and erythema of the arytenoid and interarytenoid mucosa consistent with posterior laryngitis secondary to laryngopharyngeal reflux.  The vocal cords appeared to be normal, with no nodule, ulceration, polyp, leukoplakia or other lesions, and appeared to be normally mobile bilaterally with midline approximation on phonation.  Sensation of the hypopharynx and larynx appeared to be normal when touched by the end of the flexible scope. The nasopharynx, eustachian tube orifices and fossa of Rosenmüller, oropharynx, base of tongue, hypopharynx, supraglottis, subglottis, and piriform sinuses all appeared to be normal, with no lesions.  Visualization was excellent throughout the examination.         DESCRIPTION OF PROCEDURE:  The right and left nasal cavity was topically anesthetized and

## 2025-01-20 NOTE — PATIENT INSTRUCTIONS
Decrease omeprazole to once daily on an empty stomach (when you have not eaten or drunk anything for two hours) and eat a meal or snack 45 to 60 minutes after each dose.    If your throat symptoms (cough, difficulty swallowing, feeling of a lump in the throat, frequent throat clearing, or pain in the throat) do not recur and/or increase, after 8 weeks of once daily dosing, stop omeprazole.    If your throat symptoms recur and/or increase, please call office for appointment or advice.

## 2025-05-06 ENCOUNTER — OFFICE VISIT (OUTPATIENT)
Dept: INFECTIOUS DISEASES | Age: 85
End: 2025-05-06
Payer: MEDICARE

## 2025-05-06 VITALS
TEMPERATURE: 97.5 F | SYSTOLIC BLOOD PRESSURE: 150 MMHG | HEIGHT: 57 IN | BODY MASS INDEX: 19.93 KG/M2 | WEIGHT: 92.4 LBS | HEART RATE: 76 BPM | DIASTOLIC BLOOD PRESSURE: 80 MMHG | OXYGEN SATURATION: 94 %

## 2025-05-06 DIAGNOSIS — Z86.19 HISTORY OF CLOSTRIDIOIDES DIFFICILE INFECTION: ICD-10-CM

## 2025-05-06 DIAGNOSIS — A31.0 MAI (MYCOBACTERIUM AVIUM-INTRACELLULARE) (HCC): Primary | ICD-10-CM

## 2025-05-06 DIAGNOSIS — Z98.890 HISTORY OF SINUS SURGERY: ICD-10-CM

## 2025-05-06 DIAGNOSIS — Z90.49 S/P COLECTOMY: ICD-10-CM

## 2025-05-06 DIAGNOSIS — R05.3 CHRONIC COUGH: ICD-10-CM

## 2025-05-06 DIAGNOSIS — K21.9 GASTROESOPHAGEAL REFLUX DISEASE WITHOUT ESOPHAGITIS: ICD-10-CM

## 2025-05-06 DIAGNOSIS — Z77.22 EXPOSURE TO SECOND HAND SMOKE: ICD-10-CM

## 2025-05-06 DIAGNOSIS — R89.7 ABNORMAL HISTOLOGICAL FINDINGS IN SPECIMENS FROM OTHER ORGANS, SYSTEMS AND TISSUES: ICD-10-CM

## 2025-05-06 DIAGNOSIS — B44.1 PULMONARY ASPERGILLOSIS (HCC): ICD-10-CM

## 2025-05-06 DIAGNOSIS — J47.0 BRONCHIECTASIS WITH ACUTE LOWER RESPIRATORY INFECTION (HCC): ICD-10-CM

## 2025-05-06 DIAGNOSIS — E78.5 HYPERLIPIDEMIA, UNSPECIFIED HYPERLIPIDEMIA TYPE: ICD-10-CM

## 2025-05-06 DIAGNOSIS — A31.0 MAI (MYCOBACTERIUM AVIUM-INTRACELLULARE) INFECTION (HCC): ICD-10-CM

## 2025-05-06 DIAGNOSIS — Z23 NEED FOR PNEUMOCOCCAL 20-VALENT CONJUGATE VACCINATION: ICD-10-CM

## 2025-05-06 DIAGNOSIS — K57.30 DIVERTICULOSIS OF COLON: ICD-10-CM

## 2025-05-06 PROCEDURE — G8420 CALC BMI NORM PARAMETERS: HCPCS | Performed by: INTERNAL MEDICINE

## 2025-05-06 PROCEDURE — G8399 PT W/DXA RESULTS DOCUMENT: HCPCS | Performed by: INTERNAL MEDICINE

## 2025-05-06 PROCEDURE — 90677 PCV20 VACCINE IM: CPT | Performed by: INTERNAL MEDICINE

## 2025-05-06 PROCEDURE — 1036F TOBACCO NON-USER: CPT | Performed by: INTERNAL MEDICINE

## 2025-05-06 PROCEDURE — 1159F MED LIST DOCD IN RCRD: CPT | Performed by: INTERNAL MEDICINE

## 2025-05-06 PROCEDURE — 1090F PRES/ABSN URINE INCON ASSESS: CPT | Performed by: INTERNAL MEDICINE

## 2025-05-06 PROCEDURE — 1123F ACP DISCUSS/DSCN MKR DOCD: CPT | Performed by: INTERNAL MEDICINE

## 2025-05-06 PROCEDURE — G0009 ADMIN PNEUMOCOCCAL VACCINE: HCPCS | Performed by: INTERNAL MEDICINE

## 2025-05-06 PROCEDURE — G8427 DOCREV CUR MEDS BY ELIG CLIN: HCPCS | Performed by: INTERNAL MEDICINE

## 2025-05-06 PROCEDURE — 99214 OFFICE O/P EST MOD 30 MIN: CPT | Performed by: INTERNAL MEDICINE

## 2025-05-06 ASSESSMENT — ENCOUNTER SYMPTOMS
CONSTIPATION: 0
SINUS PRESSURE: 0
SHORTNESS OF BREATH: 0
EYE DISCHARGE: 0
WHEEZING: 0
RHINORRHEA: 0
BACK PAIN: 0
SORE THROAT: 0
COUGH: 1
DIARRHEA: 0
NAUSEA: 0
SINUS PAIN: 0
ABDOMINAL PAIN: 0

## 2025-05-06 NOTE — PROGRESS NOTES
After obtaining consent, and per orders of Dr. Lira, injection of Prevnar 20 given in Left deltoid by Korin Lau MA. Patient instructed to remain in clinic for 20 minutes afterwards, and to report any adverse reaction to me immediately.    
and previous episodes of diverticulitis and C. difficile infection. The potential impact of antibiotic absorption and the risk of recurrence were discussed. No new treatment is initiated at this time.    PROCEDURE  Bronchoscopy was performed on 08/28/2023.         Labs ordered today in EPIC:    Orders Placed This Encounter   Procedures    Pneumococcal, PCV20, PREVNAR 20, (age 6w+), IM, PF       Patient education and counseling:    The patient was educated in detail about the side-effects of various antibiotics and things to watch for like new rashes, lip swelling, severereaction, worsening diarrhea, break through fever etc.  Patient was instructed to stop antibiotics and call our office if these problems were to occur, or go to nearest ER ifexperiencing severe symptoms.  Discussed patient's condition and what to expect. All of the patient's questions were addressed in a satisfactory manner and patient verbalizedunderstanding all instructions.    Follow-up:    Follow-up with me in ID clinic or through video visit as needed    Level of complexity of visit and medical decision making: High     Illness(es)/ Infection present that pose threat to bodily function.   There is potential for severe exacerbation of infection/side effects of treatment.  Therapy requires regular monitoring for antimicrobial agent toxicity.         An electronic signature was used to authenticate this note.     TIME SPENT TODAY:    - Spent over 31 minutes on visit (including interval history, physical exam, review of data including labs,cultures, imaging, development and implementation of treatment plan and coordination of care).   - Over 50% of face-to-face time spent with pt counseling andeducation.        Please note that this chart was generated using Dragon dictation software. Although every effort was made to ensure the accuracy of this automated transcription, some errors in transcription may have occurred inadvertently. If you may need

## 2025-07-25 ENCOUNTER — OFFICE VISIT (OUTPATIENT)
Dept: PULMONOLOGY | Age: 85
End: 2025-07-25
Payer: MEDICARE

## 2025-07-25 VITALS
DIASTOLIC BLOOD PRESSURE: 70 MMHG | SYSTOLIC BLOOD PRESSURE: 118 MMHG | BODY MASS INDEX: 18.81 KG/M2 | HEIGHT: 57 IN | HEART RATE: 92 BPM | OXYGEN SATURATION: 97 % | WEIGHT: 87.2 LBS

## 2025-07-25 DIAGNOSIS — B44.89 ASPERGILLUS FUMIGATUS (HCC): ICD-10-CM

## 2025-07-25 DIAGNOSIS — A31.0 PULMONARY MYCOBACTERIUM AVIUM COMPLEX (MAC) INFECTION (HCC): Primary | ICD-10-CM

## 2025-07-25 DIAGNOSIS — J47.9 BRONCHIECTASIS WITHOUT COMPLICATION (HCC): ICD-10-CM

## 2025-07-25 PROCEDURE — 1123F ACP DISCUSS/DSCN MKR DOCD: CPT | Performed by: INTERNAL MEDICINE

## 2025-07-25 PROCEDURE — 99214 OFFICE O/P EST MOD 30 MIN: CPT | Performed by: INTERNAL MEDICINE

## 2025-07-25 PROCEDURE — 1036F TOBACCO NON-USER: CPT | Performed by: INTERNAL MEDICINE

## 2025-07-25 PROCEDURE — 1159F MED LIST DOCD IN RCRD: CPT | Performed by: INTERNAL MEDICINE

## 2025-07-25 PROCEDURE — G8427 DOCREV CUR MEDS BY ELIG CLIN: HCPCS | Performed by: INTERNAL MEDICINE

## 2025-07-25 PROCEDURE — G8420 CALC BMI NORM PARAMETERS: HCPCS | Performed by: INTERNAL MEDICINE

## 2025-07-25 PROCEDURE — G2211 COMPLEX E/M VISIT ADD ON: HCPCS | Performed by: INTERNAL MEDICINE

## 2025-07-25 PROCEDURE — G8399 PT W/DXA RESULTS DOCUMENT: HCPCS | Performed by: INTERNAL MEDICINE

## 2025-07-25 PROCEDURE — 1090F PRES/ABSN URINE INCON ASSESS: CPT | Performed by: INTERNAL MEDICINE

## 2025-07-25 ASSESSMENT — ENCOUNTER SYMPTOMS
CHOKING: 0
COUGH: 1
SHORTNESS OF BREATH: 0
BLOOD IN STOOL: 0
ABDOMINAL DISTENTION: 0
APNEA: 0
STRIDOR: 0
BACK PAIN: 0
ABDOMINAL PAIN: 0
CONSTIPATION: 0
VOICE CHANGE: 0
DIARRHEA: 0
WHEEZING: 0
CHEST TIGHTNESS: 0
RHINORRHEA: 0
COLOR CHANGE: 0
SINUS PRESSURE: 0
SORE THROAT: 0
VOMITING: 0

## 2025-07-25 NOTE — PROGRESS NOTES
Hayley Oviedo    YOB: 1940     Date of Service:  7/25/2025     Chief Complaint   Patient presents with    Follow-up       HPI patient was last seen in August 2024.  Patient was seen along with her son in office today.  She actually has not been on therapy with [facility] for several months.  Also she did not go on MAC therapy on account of cost and issues related to side effects.    As per son, patient has been doing okay with minimal shortness of breath.  She has cough on and off, which she attributes to change in seasons and allergies.  Weight has been overall steady-87 pounds, though she continues to appear cachectic.    Allergies   Allergen Reactions    Sulfa Antibiotics      Nausea, vomiting     Outpatient Medications Marked as Taking for the 7/25/25 encounter (Office Visit) with Hamilton Ceballos MD   Medication Sig Dispense Refill    fluticasone (FLONASE) 50 MCG/ACT nasal spray 2 sprays in each nostril daily, for allergies. 48 g 0    omeprazole (PRILOSEC) 20 MG delayed release capsule Take 1 capsule by mouth 2 times daily; take on an empty stomach and eat a meal or snack 45 to 60 minutes hour after each dose. 180 capsule 0    ibuprofen (ADVIL;MOTRIN) 600 MG tablet Take 1 tablet by mouth 3 times daily as needed for Pain 30 tablet 0    levothyroxine (SYNTHROID) 50 MCG tablet Take 1 tablet by mouth Daily      rOPINIRole (REQUIP) 0.5 MG tablet Take 1 tablet by mouth nightly as needed      fenofibrate (TRICOR) 145 MG tablet Take 134 mg by mouth daily      topiramate (TOPAMAX SPRINKLE) 25 MG capsule Take 1 capsule by mouth at bedtime      Pseudoephedrine-Guaifenesin (MUCINEX D PO) Take 1 tablet by mouth 2 times daily.      aspirin 81 MG chewable tablet Take 1 tablet by mouth daily      butalbital-acetaminophen-caffeine (FIORICET, ESGIC) per tablet Take 1 tablet by mouth every 4 hours as needed      calcium carbonate (OSCAL) 500 MG TABS tablet Take 1 tablet by mouth 2 times daily

## 2025-08-19 ENCOUNTER — HOSPITAL ENCOUNTER (OUTPATIENT)
Dept: CT IMAGING | Age: 85
Discharge: HOME | End: 2025-08-19
Attending: INTERNAL MEDICINE
Payer: MEDICARE

## 2025-08-19 DIAGNOSIS — A31.0 PULMONARY MYCOBACTERIUM AVIUM COMPLEX (MAC) INFECTION (HCC): ICD-10-CM

## 2025-08-19 DIAGNOSIS — B44.89 ASPERGILLUS FUMIGATUS (HCC): ICD-10-CM

## 2025-08-19 PROCEDURE — 71250 CT THORAX DX C-: CPT

## 2025-08-29 ENCOUNTER — OFFICE VISIT (OUTPATIENT)
Dept: PULMONOLOGY | Age: 85
End: 2025-08-29
Payer: MEDICARE

## 2025-08-29 VITALS
BODY MASS INDEX: 19.37 KG/M2 | HEART RATE: 87 BPM | SYSTOLIC BLOOD PRESSURE: 118 MMHG | HEIGHT: 57 IN | DIASTOLIC BLOOD PRESSURE: 72 MMHG | OXYGEN SATURATION: 99 % | WEIGHT: 89.8 LBS

## 2025-08-29 DIAGNOSIS — B44.89 INFECTION BY ASPERGILLUS FUMIGATUS (HCC): Primary | ICD-10-CM

## 2025-08-29 DIAGNOSIS — R93.89 ABNORMAL CT OF THE CHEST: ICD-10-CM

## 2025-08-29 DIAGNOSIS — A31.0 PULMONARY MYCOBACTERIUM AVIUM COMPLEX (MAC) INFECTION (HCC): ICD-10-CM

## 2025-08-29 PROCEDURE — 1036F TOBACCO NON-USER: CPT | Performed by: INTERNAL MEDICINE

## 2025-08-29 PROCEDURE — 1090F PRES/ABSN URINE INCON ASSESS: CPT | Performed by: INTERNAL MEDICINE

## 2025-08-29 PROCEDURE — 1159F MED LIST DOCD IN RCRD: CPT | Performed by: INTERNAL MEDICINE

## 2025-08-29 PROCEDURE — G8427 DOCREV CUR MEDS BY ELIG CLIN: HCPCS | Performed by: INTERNAL MEDICINE

## 2025-08-29 PROCEDURE — G8420 CALC BMI NORM PARAMETERS: HCPCS | Performed by: INTERNAL MEDICINE

## 2025-08-29 PROCEDURE — G2211 COMPLEX E/M VISIT ADD ON: HCPCS | Performed by: INTERNAL MEDICINE

## 2025-08-29 PROCEDURE — 99214 OFFICE O/P EST MOD 30 MIN: CPT | Performed by: INTERNAL MEDICINE

## 2025-08-29 PROCEDURE — 1123F ACP DISCUSS/DSCN MKR DOCD: CPT | Performed by: INTERNAL MEDICINE

## 2025-08-29 ASSESSMENT — ENCOUNTER SYMPTOMS
CONSTIPATION: 0
BLOOD IN STOOL: 0
COUGH: 1
CHEST TIGHTNESS: 0
WHEEZING: 0
COLOR CHANGE: 0
SHORTNESS OF BREATH: 0
RHINORRHEA: 0
ABDOMINAL DISTENTION: 0
CHOKING: 0
STRIDOR: 0
SORE THROAT: 0
SINUS PRESSURE: 0
DIARRHEA: 0
ABDOMINAL PAIN: 0
VOMITING: 0
BACK PAIN: 0
APNEA: 0
VOICE CHANGE: 0

## (undated) DEVICE — SINGLE USE SUCTION VALVE MAJ-209: Brand: SINGLE USE SUCTION VALVE (STERILE)

## (undated) DEVICE — SYRINGE MED 10ML POLYPR LUERSLIP TIP FLAT TOP W/O SFTY DISP

## (undated) DEVICE — SYRINGE MED 50ML LUERLOCK TIP

## (undated) DEVICE — SPECIMEN TRAP: Brand: ARGYLE

## (undated) DEVICE — CONMED CHANNEL MASTER PULMONARY AND PEDIATRIC CLEANING BRUSH, 160 CM X 2.0 MM: Brand: CONMED

## (undated) DEVICE — GOWN AURORA NONREINF LG: Brand: MEDLINE INDUSTRIES, INC.

## (undated) DEVICE — ENDOSCOPIC KIT 6X3/16 FT COLON W/ 1.1 OZ 2 GWN W/O BRSH

## (undated) DEVICE — SINGLE USE BIOPSY VALVE MAJ-210: Brand: SINGLE USE BIOPSY VALVE (STERILE)